# Patient Record
Sex: FEMALE | Race: WHITE | NOT HISPANIC OR LATINO | Employment: OTHER | URBAN - METROPOLITAN AREA
[De-identification: names, ages, dates, MRNs, and addresses within clinical notes are randomized per-mention and may not be internally consistent; named-entity substitution may affect disease eponyms.]

---

## 2023-03-25 DIAGNOSIS — E78.2 MIXED HYPERLIPIDEMIA: ICD-10-CM

## 2023-03-25 DIAGNOSIS — I10 ESSENTIAL (PRIMARY) HYPERTENSION: ICD-10-CM

## 2023-03-27 RX ORDER — ROSUVASTATIN CALCIUM 5 MG/1
TABLET, COATED ORAL
Qty: 90 TABLET | Refills: 1 | OUTPATIENT
Start: 2023-03-27

## 2023-03-27 RX ORDER — LISINOPRIL AND HYDROCHLOROTHIAZIDE 12.5; 2 MG/1; MG/1
TABLET ORAL
Qty: 90 TABLET | Refills: 1 | OUTPATIENT
Start: 2023-03-27

## 2023-04-03 ENCOUNTER — LAB (OUTPATIENT)
Dept: LAB | Facility: LAB | Age: 53
End: 2023-04-03
Payer: COMMERCIAL

## 2023-04-03 DIAGNOSIS — I10 BENIGN ESSENTIAL HTN: ICD-10-CM

## 2023-04-03 DIAGNOSIS — E78.2 MIXED HYPERLIPIDEMIA: ICD-10-CM

## 2023-04-03 DIAGNOSIS — E78.2 MIXED HYPERLIPIDEMIA: Primary | ICD-10-CM

## 2023-04-03 PROBLEM — D36.9 TUBULAR ADENOMA: Status: ACTIVE | Noted: 2023-04-03

## 2023-04-03 LAB
ALANINE AMINOTRANSFERASE (SGPT) (U/L) IN SER/PLAS: 19 U/L (ref 7–45)
ALBUMIN (G/DL) IN SER/PLAS: 4.5 G/DL (ref 3.4–5)
ALKALINE PHOSPHATASE (U/L) IN SER/PLAS: 96 U/L (ref 33–110)
ANION GAP IN SER/PLAS: 13 MMOL/L (ref 10–20)
ASPARTATE AMINOTRANSFERASE (SGOT) (U/L) IN SER/PLAS: 19 U/L (ref 9–39)
BILIRUBIN TOTAL (MG/DL) IN SER/PLAS: 0.9 MG/DL (ref 0–1.2)
CALCIUM (MG/DL) IN SER/PLAS: 9.8 MG/DL (ref 8.6–10.6)
CARBON DIOXIDE, TOTAL (MMOL/L) IN SER/PLAS: 29 MMOL/L (ref 21–32)
CHLORIDE (MMOL/L) IN SER/PLAS: 102 MMOL/L (ref 98–107)
CHOLESTEROL (MG/DL) IN SER/PLAS: 168 MG/DL (ref 0–199)
CHOLESTEROL IN HDL (MG/DL) IN SER/PLAS: 71.5 MG/DL
CHOLESTEROL/HDL RATIO: 2.3
CREATININE (MG/DL) IN SER/PLAS: 0.83 MG/DL (ref 0.5–1.05)
GFR FEMALE: 83 ML/MIN/1.73M2
GLUCOSE (MG/DL) IN SER/PLAS: 100 MG/DL (ref 74–99)
LDL: 74 MG/DL (ref 0–99)
POTASSIUM (MMOL/L) IN SER/PLAS: 3.9 MMOL/L (ref 3.5–5.3)
PROTEIN TOTAL: 6.9 G/DL (ref 6.4–8.2)
SODIUM (MMOL/L) IN SER/PLAS: 140 MMOL/L (ref 136–145)
TRIGLYCERIDE (MG/DL) IN SER/PLAS: 111 MG/DL (ref 0–149)
UREA NITROGEN (MG/DL) IN SER/PLAS: 12 MG/DL (ref 6–23)
VLDL: 22 MG/DL (ref 0–40)

## 2023-04-03 PROCEDURE — 80061 LIPID PANEL: CPT

## 2023-04-03 PROCEDURE — 80053 COMPREHEN METABOLIC PANEL: CPT

## 2023-04-03 PROCEDURE — 36415 COLL VENOUS BLD VENIPUNCTURE: CPT

## 2023-04-04 ENCOUNTER — TELEPHONE (OUTPATIENT)
Dept: PRIMARY CARE | Facility: CLINIC | Age: 53
End: 2023-04-04

## 2023-04-04 NOTE — RESULT ENCOUNTER NOTE
Will discuss at visit:   Sugar, kidney, liver, electrolytes normal    Cholesterol continues to look excellent across-the-board at 168, 71, 74, 111 so I do not anticipate any changes to your medication at your visit on April 6

## 2023-04-04 NOTE — TELEPHONE ENCOUNTER
----- Message from Luis Kulkarni DO sent at 4/3/2023  9:47 PM EDT -----  Will discuss at visit:   Sugar, kidney, liver, electrolytes normal    Cholesterol continues to look excellent across-the-board at 168, 71, 74, 111 so I do not anticipate any changes to your medication at your visit on April 6

## 2023-04-05 PROBLEM — M25.561 CHRONIC PAIN OF RIGHT KNEE: Status: ACTIVE | Noted: 2023-04-05

## 2023-04-05 PROBLEM — M17.11 PRIMARY OSTEOARTHRITIS OF RIGHT KNEE: Status: ACTIVE | Noted: 2023-04-05

## 2023-04-05 PROBLEM — G89.29 CHRONIC PAIN OF RIGHT KNEE: Status: ACTIVE | Noted: 2023-04-05

## 2023-04-05 RX ORDER — POLYETHYLENE GLYCOL 3350, SODIUM CHLORIDE, SODIUM BICARBONATE, POTASSIUM CHLORIDE 420; 11.2; 5.72; 1.48 G/4L; G/4L; G/4L; G/4L
POWDER, FOR SOLUTION ORAL
COMMUNITY
Start: 2022-11-29 | End: 2023-10-16 | Stop reason: WASHOUT

## 2023-04-05 RX ORDER — LISINOPRIL AND HYDROCHLOROTHIAZIDE 12.5; 2 MG/1; MG/1
1 TABLET ORAL DAILY
COMMUNITY
End: 2023-04-06 | Stop reason: SDUPTHER

## 2023-04-05 RX ORDER — ROSUVASTATIN CALCIUM 5 MG/1
5 TABLET, COATED ORAL DAILY
COMMUNITY
End: 2023-04-06 | Stop reason: SDUPTHER

## 2023-04-06 ENCOUNTER — OFFICE VISIT (OUTPATIENT)
Dept: PRIMARY CARE | Facility: CLINIC | Age: 53
End: 2023-04-06
Payer: COMMERCIAL

## 2023-04-06 VITALS
BODY MASS INDEX: 43.59 KG/M2 | DIASTOLIC BLOOD PRESSURE: 80 MMHG | SYSTOLIC BLOOD PRESSURE: 126 MMHG | WEIGHT: 250 LBS | HEART RATE: 70 BPM

## 2023-04-06 DIAGNOSIS — E78.2 MIXED HYPERLIPIDEMIA: ICD-10-CM

## 2023-04-06 DIAGNOSIS — I10 BENIGN ESSENTIAL HTN: Primary | ICD-10-CM

## 2023-04-06 DIAGNOSIS — R14.0 FUNCTIONAL BLOATING: ICD-10-CM

## 2023-04-06 DIAGNOSIS — M25.561 CHRONIC PAIN OF RIGHT KNEE: ICD-10-CM

## 2023-04-06 DIAGNOSIS — G89.29 CHRONIC PAIN OF RIGHT KNEE: ICD-10-CM

## 2023-04-06 PROCEDURE — 3074F SYST BP LT 130 MM HG: CPT | Performed by: FAMILY MEDICINE

## 2023-04-06 PROCEDURE — 99214 OFFICE O/P EST MOD 30 MIN: CPT | Performed by: FAMILY MEDICINE

## 2023-04-06 PROCEDURE — 3079F DIAST BP 80-89 MM HG: CPT | Performed by: FAMILY MEDICINE

## 2023-04-06 RX ORDER — ROSUVASTATIN CALCIUM 5 MG/1
5 TABLET, COATED ORAL DAILY
Qty: 90 TABLET | Refills: 1 | Status: SHIPPED | OUTPATIENT
Start: 2023-04-06 | End: 2023-10-02 | Stop reason: SDUPTHER

## 2023-04-06 RX ORDER — LISINOPRIL AND HYDROCHLOROTHIAZIDE 12.5; 2 MG/1; MG/1
1 TABLET ORAL DAILY
Qty: 90 TABLET | Refills: 1 | Status: SHIPPED | OUTPATIENT
Start: 2023-04-06 | End: 2023-10-02 | Stop reason: SDUPTHER

## 2023-04-06 ASSESSMENT — PATIENT HEALTH QUESTIONNAIRE - PHQ9
2. FEELING DOWN, DEPRESSED OR HOPELESS: NOT AT ALL
SUM OF ALL RESPONSES TO PHQ9 QUESTIONS 1 AND 2: 0
1. LITTLE INTEREST OR PLEASURE IN DOING THINGS: NOT AT ALL

## 2023-04-06 NOTE — PROGRESS NOTES
Subjective   Patient ID: Indira Solomon is a 53 y.o. female who presents for Hypertension and Hyperlipidemia.    HPI   1.  Hypertension  Currently taking lisinopril-hydrochlorothiazide 20-12.5 mg daily.  She does not check her blood pressure at home.  Blood pressure today in office is 126/80.  Tolerates her medication well.  Denies chest pain, shortness of breath, headache, dizziness, leg pain or edema.    2.  Hyperlipidemia  Currently taking rosuvastatin 5 mg daily.  Last lipid panel was performed in April 2023 showing 168, 71.5, 74, 111.  Tolerates medication  Denies myalgias.    3.  Bloating  Over the last 2 to 3 months she has started noticing bloating feeling in her abdomen after she eats breakfast in the morning.  This occasionally happens.  She is also concerned that this may be related to her starting menopause.  She recently has had hot flashes once a month that last maybe a minute or 2.  She is currently taking probiotics which she feels that is helping.  She is not aware of any potential dietary causes of her symptoms.  Denies dysphagia, melena, hematochezia, hematemesis, history of GI cancers, tobacco use.    4.  Right knee pain  I have been seeing her in the past for the right knee pain, she had received a cortisone injection in January 2023.  She felt that the injection helped significantly after about a month.  She was able to go to [place] and walked around without any major pains in her right knee.  She is planning to return to [place] in September 2023.     Review of Systems  All pertinent positive symptoms are included in the history of present illness.    All other systems have been reviewed and are negative and noncontributory to this patient's current ailments.    Objective   /80   Pulse 70   Wt 113 kg (250 lb)   BMI 43.59 kg/m²     Physical Exam  CONSTITUTIONAL - INAD. Not ill appearing. Overweight  SKIN - No lesions or rashes visualized. Good skin turgor.  HEAD - Atraumatic,  normocephalic..  RESP - CTAB. No wheezing, rhonchi, or crackles.   CARDIAC - RRR. No murmurs, gallops, or rubs.  ABDOMEN - Soft, nontender, nondistended. No organomegaly. Negative McBurney sign and negative Chapman sign    Assessment/Plan     1.  Hypertension  Blood pressure is stable today at 126/80.  Continue lisinopril-hydrochlorothiazide 20-12.5 mg daily.  Blood pressure goal should be below 130/80, ideally 120/80  Please check your blood pressure at home and keep a log.  Please bring this log with you to your next appointment    2.  Hyperlipidemia  Your cholesterol looks great, continue rosuvastatin 5 mg daily.    3.  Bloating  If you are finding benefit with your probiotic, recommend to continue this.  You can also purchase over-the-counter enteric peppermint oil to see if this helps with your symptoms.  Highly recommend to monitor what food you are eating and seeing if there are any patterns that may be related to your symptoms.    4.  Right knee pain  I am happy to hear that your pain is doing well.  If you desire to have a cortisone injection prior to going to [place], I would recommend coming to the office about 1 month prior to leaving to allow time for the medication to fully work for him.    Follow up in 6 months for annual physical.

## 2023-10-02 DIAGNOSIS — I10 BENIGN ESSENTIAL HTN: ICD-10-CM

## 2023-10-02 DIAGNOSIS — E78.2 MIXED HYPERLIPIDEMIA: ICD-10-CM

## 2023-10-02 RX ORDER — LISINOPRIL AND HYDROCHLOROTHIAZIDE 12.5; 2 MG/1; MG/1
1 TABLET ORAL DAILY
Qty: 30 TABLET | Refills: 0 | Status: SHIPPED | OUTPATIENT
Start: 2023-10-02 | End: 2023-10-16 | Stop reason: SDUPTHER

## 2023-10-02 RX ORDER — ROSUVASTATIN CALCIUM 5 MG/1
5 TABLET, COATED ORAL DAILY
Qty: 30 TABLET | Refills: 0 | Status: SHIPPED | OUTPATIENT
Start: 2023-10-02 | End: 2023-10-17 | Stop reason: SDUPTHER

## 2023-10-15 ASSESSMENT — PROMIS GLOBAL HEALTH SCALE
RATE_AVERAGE_PAIN: 3
RATE_QUALITY_OF_LIFE: VERY GOOD
RATE_AVERAGE_FATIGUE: MILD
CARRYOUT_PHYSICAL_ACTIVITIES: MOSTLY
RATE_MENTAL_HEALTH: EXCELLENT
RATE_GENERAL_HEALTH: VERY GOOD
RATE_PHYSICAL_HEALTH: GOOD
RATE_SOCIAL_SATISFACTION: VERY GOOD
CARRYOUT_SOCIAL_ACTIVITIES: VERY GOOD
EMOTIONAL_PROBLEMS: NEVER

## 2023-10-16 ENCOUNTER — LAB (OUTPATIENT)
Dept: LAB | Facility: LAB | Age: 53
End: 2023-10-16
Payer: COMMERCIAL

## 2023-10-16 ENCOUNTER — OFFICE VISIT (OUTPATIENT)
Dept: PRIMARY CARE | Facility: CLINIC | Age: 53
End: 2023-10-16
Payer: COMMERCIAL

## 2023-10-16 VITALS — WEIGHT: 246 LBS | HEART RATE: 102 BPM | HEIGHT: 63 IN | BODY MASS INDEX: 43.59 KG/M2

## 2023-10-16 DIAGNOSIS — Z11.59 NEED FOR HEPATITIS C SCREENING TEST: ICD-10-CM

## 2023-10-16 DIAGNOSIS — Z11.4 ENCOUNTER FOR SCREENING FOR HIV: ICD-10-CM

## 2023-10-16 DIAGNOSIS — I10 BENIGN ESSENTIAL HTN: ICD-10-CM

## 2023-10-16 DIAGNOSIS — Z00.00 ANNUAL PHYSICAL EXAM: Primary | ICD-10-CM

## 2023-10-16 DIAGNOSIS — E78.2 MIXED HYPERLIPIDEMIA: ICD-10-CM

## 2023-10-16 DIAGNOSIS — Z12.31 ENCOUNTER FOR SCREENING MAMMOGRAM FOR BREAST CANCER: ICD-10-CM

## 2023-10-16 DIAGNOSIS — Z00.00 ANNUAL PHYSICAL EXAM: ICD-10-CM

## 2023-10-16 DIAGNOSIS — Z23 INFLUENZA VACCINE NEEDED: ICD-10-CM

## 2023-10-16 LAB
ALBUMIN SERPL BCP-MCNC: 4.4 G/DL (ref 3.4–5)
ALP SERPL-CCNC: 139 U/L (ref 33–110)
ALT SERPL W P-5'-P-CCNC: 34 U/L (ref 7–45)
ANION GAP SERPL CALC-SCNC: 13 MMOL/L
AST SERPL W P-5'-P-CCNC: 28 U/L (ref 9–39)
BASOPHILS # BLD AUTO: 0.03 X10*3/UL (ref 0–0.1)
BASOPHILS NFR BLD AUTO: 0.3 %
BILIRUB SERPL-MCNC: 0.5 MG/DL (ref 0–1.2)
BUN SERPL-MCNC: 14 MG/DL (ref 6–23)
CALCIUM SERPL-MCNC: 9.5 MG/DL (ref 8.6–10.3)
CHLORIDE SERPL-SCNC: 103 MMOL/L (ref 98–107)
CHOLEST SERPL-MCNC: 171 MG/DL (ref 0–199)
CHOLESTEROL/HDL RATIO: 3.3
CO2 SERPL-SCNC: 27 MMOL/L (ref 21–32)
CREAT SERPL-MCNC: 0.74 MG/DL (ref 0.5–1.05)
EOSINOPHIL # BLD AUTO: 0.24 X10*3/UL (ref 0–0.7)
EOSINOPHIL NFR BLD AUTO: 2.7 %
ERYTHROCYTE [DISTWIDTH] IN BLOOD BY AUTOMATED COUNT: 12.8 % (ref 11.5–14.5)
GFR SERPL CREATININE-BSD FRML MDRD: >90 ML/MIN/1.73M*2
GLUCOSE SERPL-MCNC: 93 MG/DL (ref 74–99)
HCT VFR BLD AUTO: 42.9 % (ref 36–46)
HDLC SERPL-MCNC: 52.3 MG/DL
HGB BLD-MCNC: 13.6 G/DL (ref 12–16)
HIV 1+2 AB+HIV1 P24 AG SERPL QL IA: NONREACTIVE
IMM GRANULOCYTES # BLD AUTO: 0.1 X10*3/UL (ref 0–0.7)
IMM GRANULOCYTES NFR BLD AUTO: 1.1 % (ref 0–0.9)
LDLC SERPL CALC-MCNC: 86 MG/DL
LYMPHOCYTES # BLD AUTO: 2.41 X10*3/UL (ref 1.2–4.8)
LYMPHOCYTES NFR BLD AUTO: 26.6 %
MCH RBC QN AUTO: 29.8 PG (ref 26–34)
MCHC RBC AUTO-ENTMCNC: 31.7 G/DL (ref 32–36)
MCV RBC AUTO: 94 FL (ref 80–100)
MONOCYTES # BLD AUTO: 0.64 X10*3/UL (ref 0.1–1)
MONOCYTES NFR BLD AUTO: 7.1 %
NEUTROPHILS # BLD AUTO: 5.63 X10*3/UL (ref 1.2–7.7)
NEUTROPHILS NFR BLD AUTO: 62.2 %
NON HDL CHOLESTEROL: 119 MG/DL (ref 0–149)
NRBC BLD-RTO: 0.7 /100 WBCS (ref 0–0)
PLATELET # BLD AUTO: 196 X10*3/UL (ref 150–450)
PMV BLD AUTO: 10.1 FL (ref 7.5–11.5)
POTASSIUM SERPL-SCNC: 3.7 MMOL/L (ref 3.5–5.3)
PROT SERPL-MCNC: 7.4 G/DL (ref 6.4–8.2)
RBC # BLD AUTO: 4.57 X10*6/UL (ref 4–5.2)
SODIUM SERPL-SCNC: 139 MMOL/L (ref 136–145)
TRIGL SERPL-MCNC: 162 MG/DL (ref 0–149)
TSH SERPL-ACNC: 3.19 MIU/L (ref 0.44–3.98)
VLDL: 32 MG/DL (ref 0–40)
WBC # BLD AUTO: 9.1 X10*3/UL (ref 4.4–11.3)

## 2023-10-16 PROCEDURE — 36415 COLL VENOUS BLD VENIPUNCTURE: CPT

## 2023-10-16 PROCEDURE — 87389 HIV-1 AG W/HIV-1&-2 AB AG IA: CPT

## 2023-10-16 PROCEDURE — 80053 COMPREHEN METABOLIC PANEL: CPT

## 2023-10-16 PROCEDURE — 1036F TOBACCO NON-USER: CPT | Performed by: FAMILY MEDICINE

## 2023-10-16 PROCEDURE — 99396 PREV VISIT EST AGE 40-64: CPT | Performed by: FAMILY MEDICINE

## 2023-10-16 PROCEDURE — 86803 HEPATITIS C AB TEST: CPT

## 2023-10-16 PROCEDURE — 99213 OFFICE O/P EST LOW 20 MIN: CPT | Performed by: FAMILY MEDICINE

## 2023-10-16 PROCEDURE — 90471 IMMUNIZATION ADMIN: CPT | Performed by: FAMILY MEDICINE

## 2023-10-16 PROCEDURE — 93000 ELECTROCARDIOGRAM COMPLETE: CPT | Performed by: FAMILY MEDICINE

## 2023-10-16 PROCEDURE — 80061 LIPID PANEL: CPT

## 2023-10-16 PROCEDURE — 85025 COMPLETE CBC W/AUTO DIFF WBC: CPT

## 2023-10-16 PROCEDURE — 90686 IIV4 VACC NO PRSV 0.5 ML IM: CPT | Performed by: FAMILY MEDICINE

## 2023-10-16 PROCEDURE — 84443 ASSAY THYROID STIM HORMONE: CPT

## 2023-10-16 RX ORDER — LISINOPRIL AND HYDROCHLOROTHIAZIDE 12.5; 2 MG/1; MG/1
1 TABLET ORAL DAILY
Qty: 90 TABLET | Refills: 1 | Status: SHIPPED | OUTPATIENT
Start: 2023-10-16 | End: 2024-05-01 | Stop reason: SDUPTHER

## 2023-10-16 RX ORDER — BUTYROSPERMUM PARKII(SHEA BUTTER), SIMMONDSIA CHINENSIS (JOJOBA) SEED OIL, ALOE BARBADENSIS LEAF EXTRACT .01; 1; 3.5 G/100G; G/100G; G/100G
250 LIQUID TOPICAL 2 TIMES DAILY
COMMUNITY

## 2023-10-16 ASSESSMENT — PATIENT HEALTH QUESTIONNAIRE - PHQ9
SUM OF ALL RESPONSES TO PHQ9 QUESTIONS 1 AND 2: 0
1. LITTLE INTEREST OR PLEASURE IN DOING THINGS: NOT AT ALL
2. FEELING DOWN, DEPRESSED OR HOPELESS: NOT AT ALL

## 2023-10-16 NOTE — ASSESSMENT & PLAN NOTE
Complete history and physical examination was performed  EKG reveals normal sinus rhythm without acute changes  We will notify of test results once available  Continue working on lifestyle modification which includes goal of weight loss

## 2023-10-16 NOTE — PROGRESS NOTES
Subjective   Patient ID: Indira Solomon is a 53 y.o. female who presents for Annual Exam.    HPI  1.  Physical exam.  Pap: last done 3/31/20, normal, negative HPV; apparently received note from Dr. Murphy that it is time to have her Pap testing, but she is going to have these examinations, so she has yet to make an appointment  Mammogram: last done 2022, reluctantly willing to have this done again  Colonoscopy: 2023, 5-year clearance  Immunizations are up-to-date  Feeling good today without any significant medical concerns or complaints  Recently got back from [place] where she walked 25,000 steps daily x5    2.  Hyperlipidemia.  Currently taking rosuvastatin 5 mg as prescribed  Tolerating well, requesting refill    3.  Hypertension.  Normotensive on intake, no chest pain or shortness of breath, currently taking medication noted on the chart  Tolerating well, requesting refill    Review of Systems  All pertinent positive symptoms are included in the history of present illness.    All other systems have been reviewed and are negative and noncontributory to this patient's current ailments.    Past Medical History:   Diagnosis Date    Hyperlipidemia     Hypertension     Laceration without foreign body, right foot, initial encounter 10/14/2015    Laceration of foot, right    Other microscopic hematuria 2018    Microscopic hematuria    Primary osteoarthritis of right knee 2023    Tinnitus, bilateral 2019    Tinnitus of both ears    Tubular adenoma 2020     Past Surgical History:   Procedure Laterality Date     SECTION, CLASSIC  10/07/2013     Section    CHOLECYSTECTOMY  2017    COLONOSCOPY  2020    HYSTEROSCOPY  10/07/2013    Hysteroscopy With Endometrial Ablation    TUBAL LIGATION Bilateral 2020     Social History     Tobacco Use    Smoking status: Former     Types: Cigarettes     Passive exposure: Past    Smokeless tobacco: Never     Family History  "  Problem Relation Name Age of Onset    Diabetes Father      Hypertension Father          Essential    Asthma Son      Cancer Mother's Brother       No Known Allergies  Immunization History   Administered Date(s) Administered    Flu vaccine (IIV4), preservative free *Check age/dose* 10/16/2023    Influenza, seasonal, injectable 09/29/2022    Pfizer Purple Cap SARS-CoV-2 03/23/2021, 04/13/2021, 12/18/2021    Tdap vaccine, age 7 year and older (BOOSTRIX) 10/07/2013, 09/10/2021    Zoster vaccine, recombinant, adult (SHINGRIX) 10/26/2021, 03/17/2022     Current Outpatient Medications   Medication Instructions    lisinopriL-hydrochlorothiazide 20-12.5 mg tablet 1 tablet, oral, Daily    rosuvastatin (CRESTOR) 5 mg, oral, Daily    saccharomyces boulardii (FLORASTOR) 250 mg, oral, 2 times daily     Objective   Visit Vitals  Pulse 102   Ht 1.6 m (5' 3\")   Wt 112 kg (246 lb)   BMI 43.58 kg/m²   Smoking Status Former   BSA 2.23 m²     No visits with results within 1 Month(s) from this visit.   Latest known visit with results is:   Lab on 04/03/2023   Component Date Value    Glucose 04/03/2023 100 (H)     Sodium 04/03/2023 140     Potassium 04/03/2023 3.9     Chloride 04/03/2023 102     Bicarbonate 04/03/2023 29     Anion Gap 04/03/2023 13     Urea Nitrogen 04/03/2023 12     Creatinine 04/03/2023 0.83     GFR Female 04/03/2023 83     Calcium 04/03/2023 9.8     Albumin 04/03/2023 4.5     Alkaline Phosphatase 04/03/2023 96     Total Protein 04/03/2023 6.9     AST 04/03/2023 19     Total Bilirubin 04/03/2023 0.9     ALT (SGPT) 04/03/2023 19     Cholesterol 04/03/2023 168     HDL 04/03/2023 71.5     Cholesterol/HDL Ratio 04/03/2023 2.3     LDL 04/03/2023 74     VLDL 04/03/2023 22     Triglycerides 04/03/2023 111      Physical Exam  CONSTITUTIONAL - well nourished, well developed, obese white female, looks like stated age, in no acute distress, not ill-appearing, and not tired appearing  SKIN - normal skin color and pigmentation, " normal skin turgor without rash, lesions, or nodules visualized  HEAD - no trauma, normocephalic  EYES - pupils are equal and reactive to light, extraocular muscles are intact, and normal external exam  ENT - TM's intact, no injection, no signs of infection, uvula midline, normal tongue movement and throat normal, no exudate  CHEST - clear to auscultation, no wheezing, no crackles and no rales, good effort  CARDIAC - regular rate and regular rhythm, no skipped beats, no murmur  ABDOMEN - no organomegaly, soft, nontender, nondistended, normal bowel sounds, no guarding/rebound/rigidity, negative McBurney sign and negative Chapman sign  EXTREMITIES - no edema, no deformities  NEUROLOGICAL - normal gait, normal balance, normal motor, no ataxia, DTRs equal and symmetrical; alert, oriented and no focal signs  PSYCHIATRIC - alert, pleasant and cordial, age-appropriate    Assessment/Plan   Problem List Items Addressed This Visit       Benign essential HTN     Stable, no changes to medication recommended  I would like to have you monitor and record blood pressures at home   Blood pressure goal should be below 130/80, ideally 120/80  If the blood pressure is too high or too low, we need to consider making adjustments to your antihypertensive therapy         Relevant Medications    lisinopriL-hydrochlorothiazide 20-12.5 mg tablet    Mixed hyperlipidemia     We will notify of test results once available and make recommendations on adjusting medication if necessary         Annual physical exam - Primary     Complete history and physical examination was performed  EKG reveals normal sinus rhythm without acute changes  We will notify of test results once available  Continue working on lifestyle modification which includes goal of weight loss         Relevant Orders    Lipid Panel    TSH with reflex to Free T4 if abnormal    Comprehensive Metabolic Panel    CBC and Auto Differential    HIV 1/2 Antigen/Antibody Screen with Reflex to  Confirmation    Hepatitis C Antibody    ECG 12 lead (Clinic Performed) (Completed)     Other Visit Diagnoses       Encounter for screening for HIV        Relevant Orders    HIV 1/2 Antigen/Antibody Screen with Reflex to Confirmation    Influenza vaccine needed        All questions were answered and you were counseled on immunization(s) in detail and vaccination was provided    Relevant Orders    Flu vaccine (IIV4) age 6 months and greater, preservative free (Completed)    Encounter for screening mammogram for breast cancer        Overdue for mammogram, requisition provided    Relevant Orders    BI mammo bilateral screening tomosynthesis    Need for hepatitis C screening test        Relevant Orders    Hepatitis C Antibody

## 2023-10-16 NOTE — ASSESSMENT & PLAN NOTE
We will notify of test results once available and make recommendations on adjusting medication if necessary

## 2023-10-17 DIAGNOSIS — E78.2 MIXED HYPERLIPIDEMIA: ICD-10-CM

## 2023-10-17 LAB — HCV AB SER QL: NONREACTIVE

## 2023-10-17 RX ORDER — ROSUVASTATIN CALCIUM 10 MG/1
10 TABLET, COATED ORAL DAILY
Qty: 90 TABLET | Refills: 1 | Status: SHIPPED | OUTPATIENT
Start: 2023-10-17 | End: 2024-05-02 | Stop reason: SDUPTHER

## 2023-10-18 NOTE — RESULT ENCOUNTER NOTE
Complete blood cell count stable.  Cholesterol 171, 52, 86, 162 previously 168, 71, with triglycerides 111  Sugar, kidney, liver, electrolytes, hepatitis C, HIV, thyroid normal  Alkaline phosphatase 139 previous 96, 103, 111, 137 so it seems to fluctuate for you a bit.  We will continue to monitor    Triglycerides went up a bit, although LDL is at goal so I am happy with that, but you can tolerate rosuvastatin 5 mg daily, I would asked that you let me increase that to 10 mg daily so going to send it over to the pharmacy and manner.  I think we will obtain just a little bit better control for you

## 2023-11-03 ENCOUNTER — ANCILLARY PROCEDURE (OUTPATIENT)
Dept: RADIOLOGY | Facility: CLINIC | Age: 53
End: 2023-11-03
Payer: COMMERCIAL

## 2023-11-03 DIAGNOSIS — Z12.31 ENCOUNTER FOR SCREENING MAMMOGRAM FOR BREAST CANCER: ICD-10-CM

## 2023-11-03 PROCEDURE — 77063 BREAST TOMOSYNTHESIS BI: CPT

## 2023-11-03 PROCEDURE — 77067 SCR MAMMO BI INCL CAD: CPT | Performed by: RADIOLOGY

## 2023-11-03 PROCEDURE — 77063 BREAST TOMOSYNTHESIS BI: CPT | Performed by: RADIOLOGY

## 2023-11-24 ENCOUNTER — HOSPITAL ENCOUNTER (INPATIENT)
Facility: HOSPITAL | Age: 53
LOS: 3 days | Discharge: HOME | DRG: 398 | End: 2023-11-27
Attending: STUDENT IN AN ORGANIZED HEALTH CARE EDUCATION/TRAINING PROGRAM | Admitting: SURGERY
Payer: COMMERCIAL

## 2023-11-24 ENCOUNTER — PREP FOR PROCEDURE (OUTPATIENT)
Dept: SURGERY | Facility: HOSPITAL | Age: 53
End: 2023-11-24
Payer: COMMERCIAL

## 2023-11-24 ENCOUNTER — HOSPITAL ENCOUNTER (EMERGENCY)
Facility: HOSPITAL | Age: 53
Discharge: LEFT BEFORE DISCHARGE | End: 2023-11-24
Payer: COMMERCIAL

## 2023-11-24 ENCOUNTER — APPOINTMENT (OUTPATIENT)
Dept: RADIOLOGY | Facility: HOSPITAL | Age: 53
DRG: 398 | End: 2023-11-24
Payer: COMMERCIAL

## 2023-11-24 VITALS
TEMPERATURE: 98.1 F | HEART RATE: 89 BPM | SYSTOLIC BLOOD PRESSURE: 160 MMHG | RESPIRATION RATE: 18 BRPM | WEIGHT: 240 LBS | HEIGHT: 62 IN | BODY MASS INDEX: 44.16 KG/M2 | OXYGEN SATURATION: 98 % | DIASTOLIC BLOOD PRESSURE: 83 MMHG

## 2023-11-24 DIAGNOSIS — K35.30 ACUTE APPENDICITIS WITH LOCALIZED PERITONITIS, WITHOUT PERFORATION, ABSCESS, OR GANGRENE: ICD-10-CM

## 2023-11-24 DIAGNOSIS — G89.18 ACUTE POSTOPERATIVE PAIN: ICD-10-CM

## 2023-11-24 DIAGNOSIS — K35.30 ACUTE APPENDICITIS WITH LOCALIZED PERITONITIS, WITHOUT PERFORATION, ABSCESS, OR GANGRENE: Primary | ICD-10-CM

## 2023-11-24 DIAGNOSIS — K35.80 ACUTE APPENDICITIS: Primary | ICD-10-CM

## 2023-11-24 LAB
ALBUMIN SERPL BCP-MCNC: 4.4 G/DL (ref 3.4–5)
ALP SERPL-CCNC: 112 U/L (ref 33–110)
ALT SERPL W P-5'-P-CCNC: 28 U/L (ref 7–45)
ANION GAP SERPL CALC-SCNC: 16 MMOL/L (ref 10–20)
APPEARANCE UR: CLEAR
AST SERPL W P-5'-P-CCNC: 29 U/L (ref 9–39)
BACTERIA #/AREA URNS AUTO: ABNORMAL /HPF
BASOPHILS # BLD AUTO: 0.02 X10*3/UL (ref 0–0.1)
BASOPHILS NFR BLD AUTO: 0.2 %
BILIRUB DIRECT SERPL-MCNC: 0.3 MG/DL (ref 0–0.3)
BILIRUB SERPL-MCNC: 1.2 MG/DL (ref 0–1.2)
BILIRUB UR STRIP.AUTO-MCNC: NEGATIVE MG/DL
BUN SERPL-MCNC: 14 MG/DL (ref 6–23)
CALCIUM SERPL-MCNC: 10.1 MG/DL (ref 8.6–10.3)
CHLORIDE SERPL-SCNC: 102 MMOL/L (ref 98–107)
CO2 SERPL-SCNC: 21 MMOL/L (ref 21–32)
COLOR UR: YELLOW
CREAT SERPL-MCNC: 0.77 MG/DL (ref 0.5–1.05)
EOSINOPHIL # BLD AUTO: 0.01 X10*3/UL (ref 0–0.7)
EOSINOPHIL NFR BLD AUTO: 0.1 %
ERYTHROCYTE [DISTWIDTH] IN BLOOD BY AUTOMATED COUNT: 12.6 % (ref 11.5–14.5)
GFR SERPL CREATININE-BSD FRML MDRD: >90 ML/MIN/1.73M*2
GLUCOSE SERPL-MCNC: 104 MG/DL (ref 74–99)
GLUCOSE UR STRIP.AUTO-MCNC: NEGATIVE MG/DL
HCG UR QL IA.RAPID: NEGATIVE
HCT VFR BLD AUTO: 41.7 % (ref 36–46)
HGB BLD-MCNC: 14.4 G/DL (ref 12–16)
HOLD SPECIMEN: NORMAL
IMM GRANULOCYTES # BLD AUTO: 0.04 X10*3/UL (ref 0–0.7)
IMM GRANULOCYTES NFR BLD AUTO: 0.3 % (ref 0–0.9)
KETONES UR STRIP.AUTO-MCNC: ABNORMAL MG/DL
LEUKOCYTE ESTERASE UR QL STRIP.AUTO: NEGATIVE
LIPASE SERPL-CCNC: 13 U/L (ref 9–82)
LYMPHOCYTES # BLD AUTO: 0.66 X10*3/UL (ref 1.2–4.8)
LYMPHOCYTES NFR BLD AUTO: 5.3 %
MCH RBC QN AUTO: 29.6 PG (ref 26–34)
MCHC RBC AUTO-ENTMCNC: 34.5 G/DL (ref 32–36)
MCV RBC AUTO: 86 FL (ref 80–100)
MONOCYTES # BLD AUTO: 0.63 X10*3/UL (ref 0.1–1)
MONOCYTES NFR BLD AUTO: 5.1 %
MUCOUS THREADS #/AREA URNS AUTO: ABNORMAL /LPF
NEUTROPHILS # BLD AUTO: 11.1 X10*3/UL (ref 1.2–7.7)
NEUTROPHILS NFR BLD AUTO: 89 %
NITRITE UR QL STRIP.AUTO: NEGATIVE
NRBC BLD-RTO: 0 /100 WBCS (ref 0–0)
PH UR STRIP.AUTO: 5 [PH]
PLATELET # BLD AUTO: 175 X10*3/UL (ref 150–450)
POTASSIUM SERPL-SCNC: 3.7 MMOL/L (ref 3.5–5.3)
PROT SERPL-MCNC: 7.4 G/DL (ref 6.4–8.2)
PROT UR STRIP.AUTO-MCNC: NEGATIVE MG/DL
RBC # BLD AUTO: 4.86 X10*6/UL (ref 4–5.2)
RBC # UR STRIP.AUTO: ABNORMAL /UL
RBC #/AREA URNS AUTO: ABNORMAL /HPF
SODIUM SERPL-SCNC: 135 MMOL/L (ref 136–145)
SP GR UR STRIP.AUTO: 1.02
SQUAMOUS #/AREA URNS AUTO: ABNORMAL /HPF
UROBILINOGEN UR STRIP.AUTO-MCNC: <2 MG/DL
WBC # BLD AUTO: 12.5 X10*3/UL (ref 4.4–11.3)
WBC #/AREA URNS AUTO: ABNORMAL /HPF
WBC CLUMPS #/AREA URNS AUTO: ABNORMAL /HPF

## 2023-11-24 PROCEDURE — 1210000001 HC SEMI-PRIVATE ROOM DAILY

## 2023-11-24 PROCEDURE — 74177 CT ABD & PELVIS W/CONTRAST: CPT | Performed by: RADIOLOGY

## 2023-11-24 PROCEDURE — 36415 COLL VENOUS BLD VENIPUNCTURE: CPT | Performed by: STUDENT IN AN ORGANIZED HEALTH CARE EDUCATION/TRAINING PROGRAM

## 2023-11-24 PROCEDURE — 82248 BILIRUBIN DIRECT: CPT | Performed by: STUDENT IN AN ORGANIZED HEALTH CARE EDUCATION/TRAINING PROGRAM

## 2023-11-24 PROCEDURE — 83690 ASSAY OF LIPASE: CPT | Performed by: STUDENT IN AN ORGANIZED HEALTH CARE EDUCATION/TRAINING PROGRAM

## 2023-11-24 PROCEDURE — 99281 EMR DPT VST MAYX REQ PHY/QHP: CPT

## 2023-11-24 PROCEDURE — 81001 URINALYSIS AUTO W/SCOPE: CPT | Performed by: STUDENT IN AN ORGANIZED HEALTH CARE EDUCATION/TRAINING PROGRAM

## 2023-11-24 PROCEDURE — 99285 EMERGENCY DEPT VISIT HI MDM: CPT | Mod: 25 | Performed by: STUDENT IN AN ORGANIZED HEALTH CARE EDUCATION/TRAINING PROGRAM

## 2023-11-24 PROCEDURE — 74177 CT ABD & PELVIS W/CONTRAST: CPT

## 2023-11-24 PROCEDURE — 85025 COMPLETE CBC W/AUTO DIFF WBC: CPT | Performed by: STUDENT IN AN ORGANIZED HEALTH CARE EDUCATION/TRAINING PROGRAM

## 2023-11-24 PROCEDURE — 2550000001 HC RX 255 CONTRASTS: Performed by: STUDENT IN AN ORGANIZED HEALTH CARE EDUCATION/TRAINING PROGRAM

## 2023-11-24 PROCEDURE — 81025 URINE PREGNANCY TEST: CPT | Performed by: STUDENT IN AN ORGANIZED HEALTH CARE EDUCATION/TRAINING PROGRAM

## 2023-11-24 PROCEDURE — 4500999001 HC ED NO CHARGE

## 2023-11-24 PROCEDURE — 2500000004 HC RX 250 GENERAL PHARMACY W/ HCPCS (ALT 636 FOR OP/ED): Performed by: STUDENT IN AN ORGANIZED HEALTH CARE EDUCATION/TRAINING PROGRAM

## 2023-11-24 PROCEDURE — 96374 THER/PROPH/DIAG INJ IV PUSH: CPT

## 2023-11-24 RX ORDER — MORPHINE SULFATE 4 MG/ML
4 INJECTION, SOLUTION INTRAMUSCULAR; INTRAVENOUS ONCE
Status: COMPLETED | OUTPATIENT
Start: 2023-11-24 | End: 2023-11-24

## 2023-11-24 RX ADMIN — IOHEXOL 75 ML: 350 INJECTION, SOLUTION INTRAVENOUS at 22:03

## 2023-11-24 RX ADMIN — MORPHINE SULFATE 4 MG: 4 INJECTION, SOLUTION INTRAMUSCULAR; INTRAVENOUS at 23:25

## 2023-11-24 RX ADMIN — PIPERACILLIN SODIUM AND TAZOBACTAM SODIUM 3.38 G: 3; .375 INJECTION, SOLUTION INTRAVENOUS at 23:19

## 2023-11-24 RX ADMIN — MORPHINE SULFATE 4 MG: 4 INJECTION, SOLUTION INTRAMUSCULAR; INTRAVENOUS at 21:03

## 2023-11-24 ASSESSMENT — PAIN DESCRIPTION - LOCATION
LOCATION: ABDOMEN
LOCATION: ABDOMEN

## 2023-11-24 ASSESSMENT — LIFESTYLE VARIABLES
REASON UNABLE TO ASSESS: NO
EVER HAD A DRINK FIRST THING IN THE MORNING TO STEADY YOUR NERVES TO GET RID OF A HANGOVER: NO
HAVE PEOPLE ANNOYED YOU BY CRITICIZING YOUR DRINKING: NO
HAVE YOU EVER FELT YOU SHOULD CUT DOWN ON YOUR DRINKING: NO
EVER FELT BAD OR GUILTY ABOUT YOUR DRINKING: NO

## 2023-11-24 ASSESSMENT — PAIN SCALES - GENERAL
PAINLEVEL_OUTOF10: 8
PAINLEVEL_OUTOF10: 10 - WORST POSSIBLE PAIN
PAINLEVEL_OUTOF10: 7

## 2023-11-24 ASSESSMENT — COLUMBIA-SUICIDE SEVERITY RATING SCALE - C-SSRS
1. IN THE PAST MONTH, HAVE YOU WISHED YOU WERE DEAD OR WISHED YOU COULD GO TO SLEEP AND NOT WAKE UP?: NO
6. HAVE YOU EVER DONE ANYTHING, STARTED TO DO ANYTHING, OR PREPARED TO DO ANYTHING TO END YOUR LIFE?: NO
1. IN THE PAST MONTH, HAVE YOU WISHED YOU WERE DEAD OR WISHED YOU COULD GO TO SLEEP AND NOT WAKE UP?: NO
6. HAVE YOU EVER DONE ANYTHING, STARTED TO DO ANYTHING, OR PREPARED TO DO ANYTHING TO END YOUR LIFE?: NO
2. HAVE YOU ACTUALLY HAD ANY THOUGHTS OF KILLING YOURSELF?: NO
2. HAVE YOU ACTUALLY HAD ANY THOUGHTS OF KILLING YOURSELF?: NO

## 2023-11-24 ASSESSMENT — PAIN DESCRIPTION - ORIENTATION: ORIENTATION: RIGHT;LOWER

## 2023-11-24 ASSESSMENT — PAIN DESCRIPTION - PAIN TYPE: TYPE: ACUTE PAIN

## 2023-11-24 ASSESSMENT — PAIN - FUNCTIONAL ASSESSMENT
PAIN_FUNCTIONAL_ASSESSMENT: 0-10
PAIN_FUNCTIONAL_ASSESSMENT: 0-10

## 2023-11-24 ASSESSMENT — PAIN DESCRIPTION - PROGRESSION: CLINICAL_PROGRESSION: NOT CHANGED

## 2023-11-24 NOTE — ED TRIAGE NOTES
Pt to ED with c/o RLQ abdominal pain since last night. Pt reports nausea without emesis. Denies fevers/chills. Pt reports still having appendix. Hx cholecystectomy.

## 2023-11-25 ENCOUNTER — ANESTHESIA (OUTPATIENT)
Dept: OPERATING ROOM | Facility: HOSPITAL | Age: 53
DRG: 398 | End: 2023-11-25
Payer: COMMERCIAL

## 2023-11-25 ENCOUNTER — ANESTHESIA EVENT (OUTPATIENT)
Dept: OPERATING ROOM | Facility: HOSPITAL | Age: 53
DRG: 398 | End: 2023-11-25
Payer: COMMERCIAL

## 2023-11-25 PROBLEM — E66.01 CLASS 3 SEVERE OBESITY IN ADULT (MULTI): Status: ACTIVE | Noted: 2023-11-25

## 2023-11-25 PROBLEM — E66.813 CLASS 3 SEVERE OBESITY IN ADULT: Status: ACTIVE | Noted: 2023-11-25

## 2023-11-25 LAB
ANION GAP SERPL CALC-SCNC: 15 MMOL/L (ref 10–20)
BASOPHILS # BLD AUTO: 0.02 X10*3/UL (ref 0–0.1)
BASOPHILS NFR BLD AUTO: 0.1 %
BUN SERPL-MCNC: 14 MG/DL (ref 6–23)
CALCIUM SERPL-MCNC: 8.9 MG/DL (ref 8.6–10.3)
CHLORIDE SERPL-SCNC: 104 MMOL/L (ref 98–107)
CO2 SERPL-SCNC: 23 MMOL/L (ref 21–32)
CREAT SERPL-MCNC: 0.93 MG/DL (ref 0.5–1.05)
EOSINOPHIL # BLD AUTO: 0 X10*3/UL (ref 0–0.7)
EOSINOPHIL NFR BLD AUTO: 0 %
ERYTHROCYTE [DISTWIDTH] IN BLOOD BY AUTOMATED COUNT: 13 % (ref 11.5–14.5)
GFR SERPL CREATININE-BSD FRML MDRD: 73 ML/MIN/1.73M*2
GLUCOSE SERPL-MCNC: 151 MG/DL (ref 74–99)
HCT VFR BLD AUTO: 37.9 % (ref 36–46)
HGB BLD-MCNC: 12.7 G/DL (ref 12–16)
IMM GRANULOCYTES # BLD AUTO: 0.08 X10*3/UL (ref 0–0.7)
IMM GRANULOCYTES NFR BLD AUTO: 0.5 % (ref 0–0.9)
LYMPHOCYTES # BLD AUTO: 0.68 X10*3/UL (ref 1.2–4.8)
LYMPHOCYTES NFR BLD AUTO: 4.6 %
MCH RBC QN AUTO: 29.1 PG (ref 26–34)
MCHC RBC AUTO-ENTMCNC: 33.5 G/DL (ref 32–36)
MCV RBC AUTO: 87 FL (ref 80–100)
MONOCYTES # BLD AUTO: 0.45 X10*3/UL (ref 0.1–1)
MONOCYTES NFR BLD AUTO: 3 %
NEUTROPHILS # BLD AUTO: 13.64 X10*3/UL (ref 1.2–7.7)
NEUTROPHILS NFR BLD AUTO: 91.8 %
NRBC BLD-RTO: 0 /100 WBCS (ref 0–0)
PLATELET # BLD AUTO: 188 X10*3/UL (ref 150–450)
POTASSIUM SERPL-SCNC: 4 MMOL/L (ref 3.5–5.3)
RBC # BLD AUTO: 4.37 X10*6/UL (ref 4–5.2)
SODIUM SERPL-SCNC: 138 MMOL/L (ref 136–145)
WBC # BLD AUTO: 14.9 X10*3/UL (ref 4.4–11.3)

## 2023-11-25 PROCEDURE — 2500000004 HC RX 250 GENERAL PHARMACY W/ HCPCS (ALT 636 FOR OP/ED): Performed by: ANESTHESIOLOGY

## 2023-11-25 PROCEDURE — 2500000004 HC RX 250 GENERAL PHARMACY W/ HCPCS (ALT 636 FOR OP/ED): Performed by: NURSE ANESTHETIST, CERTIFIED REGISTERED

## 2023-11-25 PROCEDURE — 87075 CULTR BACTERIA EXCEPT BLOOD: CPT | Mod: PARLAB | Performed by: SURGERY

## 2023-11-25 PROCEDURE — 3600000009 HC OR TIME - EACH INCREMENTAL 1 MINUTE - PROCEDURE LEVEL FOUR: Performed by: SURGERY

## 2023-11-25 PROCEDURE — 88304 TISSUE EXAM BY PATHOLOGIST: CPT | Mod: TC | Performed by: SURGERY

## 2023-11-25 PROCEDURE — 3600000004 HC OR TIME - INITIAL BASE CHARGE - PROCEDURE LEVEL FOUR: Performed by: SURGERY

## 2023-11-25 PROCEDURE — 88304 TISSUE EXAM BY PATHOLOGIST: CPT | Mod: TC,SUR | Performed by: SURGERY

## 2023-11-25 PROCEDURE — A44970 PR LAP,APPENDECTOMY: Performed by: ANESTHESIOLOGY

## 2023-11-25 PROCEDURE — 2720000007 HC OR 272 NO HCPCS: Performed by: SURGERY

## 2023-11-25 PROCEDURE — 2500000005 HC RX 250 GENERAL PHARMACY W/O HCPCS: Performed by: SURGERY

## 2023-11-25 PROCEDURE — 99140 ANES COMP EMERGENCY COND: CPT | Performed by: ANESTHESIOLOGY

## 2023-11-25 PROCEDURE — 2500000004 HC RX 250 GENERAL PHARMACY W/ HCPCS (ALT 636 FOR OP/ED): Performed by: SURGERY

## 2023-11-25 PROCEDURE — 36415 COLL VENOUS BLD VENIPUNCTURE: CPT | Performed by: ANESTHESIOLOGY

## 2023-11-25 PROCEDURE — 7100000001 HC RECOVERY ROOM TIME - INITIAL BASE CHARGE: Performed by: SURGERY

## 2023-11-25 PROCEDURE — 36415 COLL VENOUS BLD VENIPUNCTURE: CPT | Performed by: SURGERY

## 2023-11-25 PROCEDURE — 1100000001 HC PRIVATE ROOM DAILY

## 2023-11-25 PROCEDURE — 2500000004 HC RX 250 GENERAL PHARMACY W/ HCPCS (ALT 636 FOR OP/ED)

## 2023-11-25 PROCEDURE — 3700000002 HC GENERAL ANESTHESIA TIME - EACH INCREMENTAL 1 MINUTE: Performed by: SURGERY

## 2023-11-25 PROCEDURE — 2500000001 HC RX 250 WO HCPCS SELF ADMINISTERED DRUGS (ALT 637 FOR MEDICARE OP): Performed by: SURGERY

## 2023-11-25 PROCEDURE — 99024 POSTOP FOLLOW-UP VISIT: CPT | Performed by: SURGERY

## 2023-11-25 PROCEDURE — A4217 STERILE WATER/SALINE, 500 ML: HCPCS | Performed by: SURGERY

## 2023-11-25 PROCEDURE — 0DTJ4ZZ RESECTION OF APPENDIX, PERCUTANEOUS ENDOSCOPIC APPROACH: ICD-10-PCS | Performed by: SURGERY

## 2023-11-25 PROCEDURE — 80048 BASIC METABOLIC PNL TOTAL CA: CPT | Performed by: ANESTHESIOLOGY

## 2023-11-25 PROCEDURE — A44970 PR LAP,APPENDECTOMY: Performed by: NURSE ANESTHETIST, CERTIFIED REGISTERED

## 2023-11-25 PROCEDURE — 99223 1ST HOSP IP/OBS HIGH 75: CPT | Performed by: SURGERY

## 2023-11-25 PROCEDURE — 7100000002 HC RECOVERY ROOM TIME - EACH INCREMENTAL 1 MINUTE: Performed by: SURGERY

## 2023-11-25 PROCEDURE — 96372 THER/PROPH/DIAG INJ SC/IM: CPT | Performed by: SURGERY

## 2023-11-25 PROCEDURE — 88304 TISSUE EXAM BY PATHOLOGIST: CPT | Performed by: PATHOLOGY

## 2023-11-25 PROCEDURE — 44970 LAPAROSCOPY APPENDECTOMY: CPT | Performed by: SURGERY

## 2023-11-25 PROCEDURE — 85025 COMPLETE CBC W/AUTO DIFF WBC: CPT | Performed by: SURGERY

## 2023-11-25 PROCEDURE — 2500000005 HC RX 250 GENERAL PHARMACY W/O HCPCS: Performed by: NURSE ANESTHETIST, CERTIFIED REGISTERED

## 2023-11-25 PROCEDURE — 3700000001 HC GENERAL ANESTHESIA TIME - INITIAL BASE CHARGE: Performed by: SURGERY

## 2023-11-25 RX ORDER — MIDAZOLAM HYDROCHLORIDE 1 MG/ML
INJECTION, SOLUTION INTRAMUSCULAR; INTRAVENOUS AS NEEDED
Status: DISCONTINUED | OUTPATIENT
Start: 2023-11-25 | End: 2023-11-25

## 2023-11-25 RX ORDER — ACETAMINOPHEN 325 MG/1
650 TABLET ORAL EVERY 4 HOURS PRN
Status: DISCONTINUED | OUTPATIENT
Start: 2023-11-25 | End: 2023-11-25

## 2023-11-25 RX ORDER — NALOXONE HYDROCHLORIDE 1 MG/ML
0.2 INJECTION INTRAMUSCULAR; INTRAVENOUS; SUBCUTANEOUS EVERY 5 MIN PRN
Status: DISCONTINUED | OUTPATIENT
Start: 2023-11-25 | End: 2023-11-27 | Stop reason: HOSPADM

## 2023-11-25 RX ORDER — KETOROLAC TROMETHAMINE 30 MG/ML
INJECTION, SOLUTION INTRAMUSCULAR; INTRAVENOUS AS NEEDED
Status: DISCONTINUED | OUTPATIENT
Start: 2023-11-25 | End: 2023-11-25

## 2023-11-25 RX ORDER — OXYCODONE AND ACETAMINOPHEN 5; 325 MG/1; MG/1
1 TABLET ORAL EVERY 6 HOURS PRN
Qty: 8 TABLET | Refills: 0 | OUTPATIENT
Start: 2023-11-25

## 2023-11-25 RX ORDER — PROMETHAZINE HYDROCHLORIDE 25 MG/1
25 TABLET ORAL EVERY 6 HOURS PRN
Status: DISCONTINUED | OUTPATIENT
Start: 2023-11-25 | End: 2023-11-27 | Stop reason: HOSPADM

## 2023-11-25 RX ORDER — PROMETHAZINE HYDROCHLORIDE 25 MG/1
25 SUPPOSITORY RECTAL EVERY 12 HOURS PRN
Status: DISCONTINUED | OUTPATIENT
Start: 2023-11-25 | End: 2023-11-27 | Stop reason: HOSPADM

## 2023-11-25 RX ORDER — ROCURONIUM BROMIDE 10 MG/ML
INJECTION, SOLUTION INTRAVENOUS AS NEEDED
Status: DISCONTINUED | OUTPATIENT
Start: 2023-11-25 | End: 2023-11-25

## 2023-11-25 RX ORDER — METOCLOPRAMIDE HYDROCHLORIDE 5 MG/ML
INJECTION INTRAMUSCULAR; INTRAVENOUS AS NEEDED
Status: DISCONTINUED | OUTPATIENT
Start: 2023-11-25 | End: 2023-11-25

## 2023-11-25 RX ORDER — FAMOTIDINE 10 MG/ML
20 INJECTION INTRAVENOUS 2 TIMES DAILY
Status: DISCONTINUED | OUTPATIENT
Start: 2023-11-25 | End: 2023-11-27 | Stop reason: HOSPADM

## 2023-11-25 RX ORDER — ONDANSETRON HYDROCHLORIDE 2 MG/ML
4 INJECTION, SOLUTION INTRAVENOUS EVERY 8 HOURS PRN
Status: DISCONTINUED | OUTPATIENT
Start: 2023-11-25 | End: 2023-11-27 | Stop reason: HOSPADM

## 2023-11-25 RX ORDER — IPRATROPIUM BROMIDE 0.5 MG/2.5ML
500 SOLUTION RESPIRATORY (INHALATION) ONCE
Status: DISCONTINUED | OUTPATIENT
Start: 2023-11-25 | End: 2023-11-25 | Stop reason: HOSPADM

## 2023-11-25 RX ORDER — HEPARIN SODIUM 5000 [USP'U]/ML
7500 INJECTION, SOLUTION INTRAVENOUS; SUBCUTANEOUS EVERY 8 HOURS SCHEDULED
Status: DISCONTINUED | OUTPATIENT
Start: 2023-11-25 | End: 2023-11-27 | Stop reason: HOSPADM

## 2023-11-25 RX ORDER — DIPHENHYDRAMINE HYDROCHLORIDE 50 MG/ML
12.5 INJECTION INTRAMUSCULAR; INTRAVENOUS ONCE AS NEEDED
Status: DISCONTINUED | OUTPATIENT
Start: 2023-11-25 | End: 2023-11-25 | Stop reason: HOSPADM

## 2023-11-25 RX ORDER — OXYCODONE HYDROCHLORIDE 5 MG/1
5 TABLET ORAL EVERY 6 HOURS PRN
Status: DISCONTINUED | OUTPATIENT
Start: 2023-11-25 | End: 2023-11-27 | Stop reason: HOSPADM

## 2023-11-25 RX ORDER — SODIUM CHLORIDE, SODIUM LACTATE, POTASSIUM CHLORIDE, CALCIUM CHLORIDE 600; 310; 30; 20 MG/100ML; MG/100ML; MG/100ML; MG/100ML
100 INJECTION, SOLUTION INTRAVENOUS CONTINUOUS
Status: DISCONTINUED | OUTPATIENT
Start: 2023-11-25 | End: 2023-11-25

## 2023-11-25 RX ORDER — SODIUM CHLORIDE 0.9 G/100ML
IRRIGANT IRRIGATION AS NEEDED
Status: DISCONTINUED | OUTPATIENT
Start: 2023-11-25 | End: 2023-11-25 | Stop reason: HOSPADM

## 2023-11-25 RX ORDER — DEXAMETHASONE SODIUM PHOSPHATE 10 MG/ML
6 INJECTION INTRAMUSCULAR; INTRAVENOUS ONCE
Status: COMPLETED | OUTPATIENT
Start: 2023-11-25 | End: 2023-11-25

## 2023-11-25 RX ORDER — ONDANSETRON 4 MG/1
4 TABLET, ORALLY DISINTEGRATING ORAL EVERY 8 HOURS PRN
Status: DISCONTINUED | OUTPATIENT
Start: 2023-11-25 | End: 2023-11-27 | Stop reason: HOSPADM

## 2023-11-25 RX ORDER — HYDROMORPHONE HYDROCHLORIDE 1 MG/ML
INJECTION, SOLUTION INTRAMUSCULAR; INTRAVENOUS; SUBCUTANEOUS
Status: COMPLETED
Start: 2023-11-25 | End: 2023-11-25

## 2023-11-25 RX ORDER — DEXAMETHASONE SODIUM PHOSPHATE 4 MG/ML
INJECTION, SOLUTION INTRA-ARTICULAR; INTRALESIONAL; INTRAMUSCULAR; INTRAVENOUS; SOFT TISSUE AS NEEDED
Status: DISCONTINUED | OUTPATIENT
Start: 2023-11-25 | End: 2023-11-25

## 2023-11-25 RX ORDER — FENTANYL CITRATE 50 UG/ML
INJECTION, SOLUTION INTRAMUSCULAR; INTRAVENOUS AS NEEDED
Status: DISCONTINUED | OUTPATIENT
Start: 2023-11-25 | End: 2023-11-25

## 2023-11-25 RX ORDER — ALBUTEROL SULFATE 0.83 MG/ML
2.5 SOLUTION RESPIRATORY (INHALATION) ONCE AS NEEDED
Status: DISCONTINUED | OUTPATIENT
Start: 2023-11-25 | End: 2023-11-25 | Stop reason: HOSPADM

## 2023-11-25 RX ORDER — LIDOCAINE HYDROCHLORIDE 20 MG/ML
INJECTION, SOLUTION INFILTRATION; PERINEURAL AS NEEDED
Status: DISCONTINUED | OUTPATIENT
Start: 2023-11-25 | End: 2023-11-25

## 2023-11-25 RX ORDER — KETOROLAC TROMETHAMINE 30 MG/ML
30 INJECTION, SOLUTION INTRAMUSCULAR; INTRAVENOUS ONCE AS NEEDED
Status: DISCONTINUED | OUTPATIENT
Start: 2023-11-25 | End: 2023-11-25

## 2023-11-25 RX ORDER — LIDOCAINE HYDROCHLORIDE 10 MG/ML
0.1 INJECTION INFILTRATION; PERINEURAL ONCE
Status: DISCONTINUED | OUTPATIENT
Start: 2023-11-25 | End: 2023-11-25 | Stop reason: HOSPADM

## 2023-11-25 RX ORDER — ACETAMINOPHEN 325 MG/1
650 TABLET ORAL EVERY 4 HOURS PRN
Status: DISCONTINUED | OUTPATIENT
Start: 2023-11-25 | End: 2023-11-27 | Stop reason: HOSPADM

## 2023-11-25 RX ORDER — ONDANSETRON HYDROCHLORIDE 2 MG/ML
4 INJECTION, SOLUTION INTRAVENOUS ONCE AS NEEDED
Status: DISCONTINUED | OUTPATIENT
Start: 2023-11-25 | End: 2023-11-25 | Stop reason: HOSPADM

## 2023-11-25 RX ORDER — FAMOTIDINE 20 MG/1
20 TABLET, FILM COATED ORAL 2 TIMES DAILY
Status: DISCONTINUED | OUTPATIENT
Start: 2023-11-25 | End: 2023-11-27 | Stop reason: HOSPADM

## 2023-11-25 RX ORDER — PROPOFOL 10 MG/ML
INJECTION, EMULSION INTRAVENOUS AS NEEDED
Status: DISCONTINUED | OUTPATIENT
Start: 2023-11-25 | End: 2023-11-25

## 2023-11-25 RX ORDER — ONDANSETRON HYDROCHLORIDE 2 MG/ML
4 INJECTION, SOLUTION INTRAVENOUS ONCE AS NEEDED
Status: DISCONTINUED | OUTPATIENT
Start: 2023-11-25 | End: 2023-11-25

## 2023-11-25 RX ORDER — BUPIVACAINE HYDROCHLORIDE 5 MG/ML
INJECTION, SOLUTION PERINEURAL AS NEEDED
Status: DISCONTINUED | OUTPATIENT
Start: 2023-11-25 | End: 2023-11-25 | Stop reason: HOSPADM

## 2023-11-25 RX ORDER — ONDANSETRON HYDROCHLORIDE 2 MG/ML
INJECTION, SOLUTION INTRAVENOUS AS NEEDED
Status: DISCONTINUED | OUTPATIENT
Start: 2023-11-25 | End: 2023-11-25

## 2023-11-25 RX ADMIN — ONDANSETRON 4 MG: 2 INJECTION INTRAMUSCULAR; INTRAVENOUS at 03:14

## 2023-11-25 RX ADMIN — SODIUM CHLORIDE, POTASSIUM CHLORIDE, SODIUM LACTATE AND CALCIUM CHLORIDE: 600; 310; 30; 20 INJECTION, SOLUTION INTRAVENOUS at 02:21

## 2023-11-25 RX ADMIN — DEXAMETHASONE SODIUM PHOSPHATE 4 MG: 4 INJECTION, SOLUTION INTRAMUSCULAR; INTRAVENOUS at 02:45

## 2023-11-25 RX ADMIN — MIDAZOLAM 2 MG: 1 INJECTION INTRAMUSCULAR; INTRAVENOUS at 02:24

## 2023-11-25 RX ADMIN — FAMOTIDINE 20 MG: 20 TABLET ORAL at 09:46

## 2023-11-25 RX ADMIN — SUGAMMADEX 200 MG: 100 INJECTION, SOLUTION INTRAVENOUS at 03:29

## 2023-11-25 RX ADMIN — FAMOTIDINE 20 MG: 10 INJECTION, SOLUTION INTRAVENOUS at 21:03

## 2023-11-25 RX ADMIN — HEPARIN SODIUM 7500 UNITS: 5000 INJECTION INTRAVENOUS; SUBCUTANEOUS at 21:04

## 2023-11-25 RX ADMIN — METOCLOPRAMIDE 10 MG: 5 INJECTION, SOLUTION INTRAMUSCULAR; INTRAVENOUS at 02:45

## 2023-11-25 RX ADMIN — PIPERACILLIN SODIUM AND TAZOBACTAM SODIUM 3.38 G: 3; .375 INJECTION, SOLUTION INTRAVENOUS at 12:43

## 2023-11-25 RX ADMIN — OXYCODONE HYDROCHLORIDE 5 MG: 5 TABLET ORAL at 09:54

## 2023-11-25 RX ADMIN — OXYCODONE HYDROCHLORIDE 5 MG: 5 TABLET ORAL at 16:20

## 2023-11-25 RX ADMIN — SODIUM CHLORIDE, POTASSIUM CHLORIDE, SODIUM LACTATE AND CALCIUM CHLORIDE 100 ML/HR: 600; 310; 30; 20 INJECTION, SOLUTION INTRAVENOUS at 03:33

## 2023-11-25 RX ADMIN — FENTANYL CITRATE 100 MCG: 50 INJECTION, SOLUTION INTRAMUSCULAR; INTRAVENOUS at 02:24

## 2023-11-25 RX ADMIN — PIPERACILLIN SODIUM AND TAZOBACTAM SODIUM 3.38 G: 3; .375 INJECTION, SOLUTION INTRAVENOUS at 06:50

## 2023-11-25 RX ADMIN — PIPERACILLIN SODIUM AND TAZOBACTAM SODIUM 3.38 G: 3; .375 INJECTION, SOLUTION INTRAVENOUS at 18:21

## 2023-11-25 RX ADMIN — ROCURONIUM BROMIDE 50 MG: 10 INJECTION, SOLUTION INTRAVENOUS at 02:24

## 2023-11-25 RX ADMIN — KETOROLAC TROMETHAMINE 30 MG: 30 INJECTION, SOLUTION INTRAMUSCULAR; INTRAVENOUS at 03:14

## 2023-11-25 RX ADMIN — HEPARIN SODIUM 7500 UNITS: 5000 INJECTION INTRAVENOUS; SUBCUTANEOUS at 14:55

## 2023-11-25 RX ADMIN — SODIUM CHLORIDE, POTASSIUM CHLORIDE, SODIUM LACTATE AND CALCIUM CHLORIDE 100 ML/HR: 600; 310; 30; 20 INJECTION, SOLUTION INTRAVENOUS at 06:51

## 2023-11-25 RX ADMIN — PROPOFOL 200 MG: 10 INJECTION, EMULSION INTRAVENOUS at 02:24

## 2023-11-25 RX ADMIN — HYDROMORPHONE HYDROCHLORIDE 0.5 MG: 1 INJECTION, SOLUTION INTRAMUSCULAR; INTRAVENOUS; SUBCUTANEOUS at 02:03

## 2023-11-25 RX ADMIN — LIDOCAINE HYDROCHLORIDE 100 MG: 20 INJECTION, SOLUTION INFILTRATION; PERINEURAL at 02:24

## 2023-11-25 RX ADMIN — DEXAMETHASONE SODIUM PHOSPHATE 6 MG: 10 INJECTION, SOLUTION INTRAMUSCULAR; INTRAVENOUS at 02:04

## 2023-11-25 SDOH — SOCIAL STABILITY: SOCIAL INSECURITY: HAVE YOU HAD THOUGHTS OF HARMING ANYONE ELSE?: NO

## 2023-11-25 SDOH — SOCIAL STABILITY: SOCIAL INSECURITY: WERE YOU ABLE TO COMPLETE ALL THE BEHAVIORAL HEALTH SCREENINGS?: YES

## 2023-11-25 SDOH — SOCIAL STABILITY: SOCIAL INSECURITY: DO YOU FEEL UNSAFE GOING BACK TO THE PLACE WHERE YOU ARE LIVING?: NO

## 2023-11-25 SDOH — HEALTH STABILITY: MENTAL HEALTH: CURRENT SMOKER: 0

## 2023-11-25 SDOH — SOCIAL STABILITY: SOCIAL INSECURITY: HAS ANYONE EVER THREATENED TO HURT YOUR FAMILY OR YOUR PETS?: NO

## 2023-11-25 SDOH — SOCIAL STABILITY: SOCIAL INSECURITY: ARE THERE ANY APPARENT SIGNS OF INJURIES/BEHAVIORS THAT COULD BE RELATED TO ABUSE/NEGLECT?: NO

## 2023-11-25 SDOH — SOCIAL STABILITY: SOCIAL INSECURITY: ARE YOU OR HAVE YOU BEEN THREATENED OR ABUSED PHYSICALLY, EMOTIONALLY, OR SEXUALLY BY ANYONE?: NO

## 2023-11-25 SDOH — SOCIAL STABILITY: SOCIAL INSECURITY: DOES ANYONE TRY TO KEEP YOU FROM HAVING/CONTACTING OTHER FRIENDS OR DOING THINGS OUTSIDE YOUR HOME?: NO

## 2023-11-25 SDOH — SOCIAL STABILITY: SOCIAL INSECURITY: ABUSE: ADULT

## 2023-11-25 SDOH — SOCIAL STABILITY: SOCIAL INSECURITY: DO YOU FEEL ANYONE HAS EXPLOITED OR TAKEN ADVANTAGE OF YOU FINANCIALLY OR OF YOUR PERSONAL PROPERTY?: NO

## 2023-11-25 ASSESSMENT — ACTIVITIES OF DAILY LIVING (ADL)
GROOMING: INDEPENDENT
HEARING - RIGHT EAR: FUNCTIONAL
WALKS IN HOME: INDEPENDENT
BATHING: INDEPENDENT
DRESSING YOURSELF: INDEPENDENT
JUDGMENT_ADEQUATE_SAFELY_COMPLETE_DAILY_ACTIVITIES: YES
LACK_OF_TRANSPORTATION: NO
PATIENT'S MEMORY ADEQUATE TO SAFELY COMPLETE DAILY ACTIVITIES?: YES
FEEDING YOURSELF: INDEPENDENT
HEARING - LEFT EAR: FUNCTIONAL
TOILETING: INDEPENDENT
ADEQUATE_TO_COMPLETE_ADL: YES

## 2023-11-25 ASSESSMENT — PAIN - FUNCTIONAL ASSESSMENT
PAIN_FUNCTIONAL_ASSESSMENT: 0-10
PAIN_FUNCTIONAL_ASSESSMENT: 0-10

## 2023-11-25 ASSESSMENT — LIFESTYLE VARIABLES
HOW MANY STANDARD DRINKS CONTAINING ALCOHOL DO YOU HAVE ON A TYPICAL DAY: 1 OR 2
HOW OFTEN DO YOU HAVE 6 OR MORE DRINKS ON ONE OCCASION: NEVER
AUDIT-C TOTAL SCORE: 1
SUBSTANCE_ABUSE_PAST_12_MONTHS: NO
SKIP TO QUESTIONS 9-10: 1
HOW OFTEN DO YOU HAVE A DRINK CONTAINING ALCOHOL: MONTHLY OR LESS
AUDIT-C TOTAL SCORE: 1
PRESCIPTION_ABUSE_PAST_12_MONTHS: NO

## 2023-11-25 ASSESSMENT — PAIN SCALES - GENERAL
PAINLEVEL_OUTOF10: 7
PAINLEVEL_OUTOF10: 7
PAINLEVEL_OUTOF10: 6
PAINLEVEL_OUTOF10: 0 - NO PAIN
PAINLEVEL_OUTOF10: 0 - NO PAIN
PAIN_LEVEL: 0

## 2023-11-25 ASSESSMENT — COGNITIVE AND FUNCTIONAL STATUS - GENERAL
MOBILITY SCORE: 24
PATIENT BASELINE BEDBOUND: NO
DAILY ACTIVITIY SCORE: 24

## 2023-11-25 ASSESSMENT — PATIENT HEALTH QUESTIONNAIRE - PHQ9
SUM OF ALL RESPONSES TO PHQ9 QUESTIONS 1 & 2: 0
1. LITTLE INTEREST OR PLEASURE IN DOING THINGS: NOT AT ALL
2. FEELING DOWN, DEPRESSED OR HOPELESS: NOT AT ALL

## 2023-11-25 NOTE — ANESTHESIA POSTPROCEDURE EVALUATION
Patient: Indira Solomon    Procedure Summary       Date: 11/25/23 Room / Location: [facility] OR 03 / [facility]    Anesthesia Start: 0221 Anesthesia Stop:     Procedure: Appendectomy Laparoscopy Diagnosis:       Acute appendicitis with localized peritonitis, without perforation, abscess, or gangrene      (Acute appendicitis with localized peritonitis, without perforation, abscess, or gangrene [K35.30])    Surgeons: Ji Givens MD Responsible Provider: Jarocho Ratliff MD    Anesthesia Type: general ASA Status: 3 - Emergent            Anesthesia Type: general    Vitals Value Taken Time   /94 11/25/23 0337   Temp 37.7 11/25/23 0337   Pulse 99 11/25/23 0337   Resp 18 11/25/23 0337   SpO2 96 11/25/23 0337       Anesthesia Post Evaluation    Patient location during evaluation: PACU  Patient participation: complete - patient participated  Level of consciousness: awake and alert  Pain score: 0  Pain management: adequate  Airway patency: patent  Cardiovascular status: acceptable and hemodynamically stable  Respiratory status: acceptable and face mask  Hydration status: acceptable  Postoperative Nausea and Vomiting: none        There were no known notable events for this encounter.

## 2023-11-25 NOTE — H&P
History Of Present Illness  Indira Solomon is a 53 y.o. female presenting with abdominal pain.  On gi evening, around 9 PM, 2023, the patient developed the acute onset of right lower quadrant abdominal pain which  has been persistent and progressive.  She denies any and all other symptoms including fever chills sweats nausea vomiting diarrhea or constipation.    Laboratory workup revealed satisfactory chemistries except for a sodium of 135 and a alkaline phosphatase of 112 and a glucose of 104.  CBC and differential revealed a mild leukocytosis to 12.5 with a left shift.    CT scans performed the abdomen pelvis with IV contrast.  This revealed a distended appendix with at least 2 appendicoliths, and periappendiceal inflammation, consistent with appendicitis.    Patient does have a history of a  x 2 through Pfannenstiel incision and previous laparoscopic cholecystectomy.    The patient and her  live in a house in [place].     Past Medical History  Past Medical History:   Diagnosis Date    Hyperlipidemia     Hypertension     Laceration without foreign body, right foot, initial encounter 10/14/2015    Laceration of foot, right    Other microscopic hematuria 2018    Microscopic hematuria    Primary osteoarthritis of right knee 2023    Tinnitus, bilateral 2019    Tinnitus of both ears    Tubular adenoma 2020       Surgical History  Past Surgical History:   Procedure Laterality Date     SECTION, CLASSIC  10/07/2013     Section    CHOLECYSTECTOMY  2017    COLONOSCOPY  2020    HYSTEROSCOPY  10/07/2013    Hysteroscopy With Endometrial Ablation    TUBAL LIGATION Bilateral 2020        Social History  She reports that she has quit smoking. Her smoking use included cigarettes. She has been exposed to tobacco smoke. She has never used smokeless tobacco. No history on file for alcohol use and drug use.    Family History  Family History    Problem Relation Name Age of Onset    Diabetes Father      Hypertension Father          Essential    Asthma Son      Cancer Mother's Brother          Allergies  Patient has no known allergies.    Review of Systems  Review of Systems    General: Not Present- Obesity, Cancer, HIV, MRSA, Recent Cold/Flu, Tired During the Day and VRE.  HEENT: Not Present- Migraine, Cataracts, Glaucoma, Macular Degeneration and Retinal Detachment.  Respiratory: Not Present- Asthma, Chronic Cough, Difficulty Breathing on Exertion, Difficulty Breathing at Rest, Emphysema, Frequent Bronchitis, Home CPAP/BiPAP, Home Oxygen, Pulmonary Embolus, Pneumonia/TB, Sleep Apnea and Snoring.  Cardiovascular: Not Present- Chest Pain, Congestive Heart Failure, Heart Attack, Coronary Artery Disease, Heart Stent, High Cholesterol/Lipids, Hypertension, Internal Defibrillator, Irregular Heart Beat, Mitral Valve Prolapse, Murmur, Pacemaker and Peripheral Vascular Disease.  Gastrointestinal: Not Present- Heartburn, Hepatitis, Hiatal Hernia, Jaundice, Reflux, Stomach Ulcer and IBS.  Female Genitourinary: Not Present- Kidney Failure, Kidney Stones, Dialysis and Urinary Tract Infection.  Musculoskeletal: Not Present- Arthritis, Back Pain and Fibromyalgia.  Neurological: Not Present- Headaches, Numbness, Tingling, Seizures, Stroke,  Shunt and Weakness.  Psychiatric: Not Present- Anxiety, Bipolar, Depression and Panic Attacks.  Endocrine: Not Present- Diabetes, Hyperthyroidism, Hypothyroidism and Low Blood Sugar.  Hematology: Not Present- Abnormal Bleeding, Anemia and Blood Clots.      Physical Exam  General Complete Physical Exam    The physical exam findings are as follows:    General Appearance - Cooperative and Well groomed. Not in acute distress. Build & Nutrition - Well nourished.  Posture - Normal posture. Gait - Normal. Hydration - Well hydrated.   Obese.    Integumentary  General Characteristics: Overall examination of the patient's skin reveals - no  rashes, no suspicious lesions, no bruises and no evidence of scars. Color - normal coloration of skin. Skin Moisture - normal skin moisture. Temperature - normal  warmth is noted. Texture - normal skin texture.    Head and Neck  Head - normocephalic, atraumatic with no lesions or palpable masses.  Neck  Global Assessment - full range of motion. no bruit auscultated on the right, no bruit auscultated on the left, non-tender, no lymphadenopathy, no palpable mass on the right and no palpable mass on the left.  Trachea - midline.  Thyroid Gland Characteristics - no palpable nodules, normal position, symmetric and smooth.    Eyes  Sclera/Conjunctiva - Bilateral - Normal. Pupil - Bilateral - Accommodating, Direct reaction to light normal and Equal.    ENMT  Global Assessment  Upon examination of the ears, nose, mouth and throat - examination of the oral cavity reveals normal lips, teeth, gums and oral mucosa and hard and soft palates, tongue, tonsils and posterior pharynx are moist and symmetric without lesions.    Chest and Lung Exam  Inspection: Shape - Symmetric. Movements - Symmetrical. Accessory muscles - No use of accessory muscles in breathing.  Percussion:  Quality and Intensity: - Percussion normal.  Palpation: - Palpation normal.  Auscultation:  Breath sounds: - Normal and equal bilaterally.  Adventitious sounds: - none bilaterally.    Cardiovascular  Inspection: Carotid artery - Bilateral - Inspection Normal.  Palpation/Percussion: Examination by palpation and percussion reveals - No Thrills.  Cardiac Borders - Normal.  Auscultation: Rhythm - Regular. Rate: Regular.  Heart Sounds - Normal heart sounds, S1 WNL and S2 WNL.  Murmurs & Other Heart Sounds: Auscultation of the heart reveals - No Murmurs or other extra heart sounds.    Abdomen  Inspection: Inspection of the abdomen reveals - No Hernias.  Palpation/Percussion: Palpation and Percussion of the abdomen reveal -tenderness at McBurney's point right lower  "quadrant and No Palpable abdominal  masses.  Liver: - Normal.  Spleen: - Normal.  Auscultation: Auscultation of the abdomen reveals - Bowel sounds normal.  Surgical scars: Upper scopic and Pfannenstiel  Obese abdomen with large redundant pannus     Inguinal  No inguinal or femoral hernias or lymphadenopathy bilaterally.    Rectal - Did not examine.    Peripheral Vascular  Lower Extremity: Inspection - Bilateral - No Varicose veins.  Palpation: Edema - Bilateral - No edema.    Musculoskeletal  Global Assessment  Right Upper Extremity - no deformities, masses or tenderness, no known fractures, normal strength and tone and  normal range of motion without pain. Left Upper Extremity - no deformities, masses or tenderness, no known fractures,  normal strength and tone and normal range of motion without pain. Right Lower Extremity - no deformities, masses or  tenderness, no known fractures, normal strength and tone and normal range of motion without pain. Left Lower  Extremity - no deformities, masses or tenderness, no known fractures, normal strength and tone and normal range of  motion without pain.    Lymphatic  Head & Neck  General Head & Neck Lymphatics:  Bilateral: Description - Normal.  Axillary  General Axillary Region:  Bilateral: Description - Normal.  Femoral & Inguinal  Generalized Femoral & Inguinal Lymphatics:  Bilateral: Description - Normal.  Last Recorded Vitals  Blood pressure 136/66, pulse 109, temperature 37 °C (98.6 °F), resp. rate 20, height 1.6 m (5' 2.99\"), weight 108 kg (238 lb 1.6 oz), SpO2 96 %.    Relevant Results             Assessment/Plan   Principal Problem:    Acute appendicitis with localized peritonitis, without perforation, abscess, or gangrene      Plan:    Patient will be placed on IV fluids, antibiotics, analgesics and anti-emetics as indicated.  The patient will be taken to the operating room for a laparoscopic, possible open, appendectomy.    Laparoscopic Appendectomy Consent: The " procedure was explained to the patient in detail, including the risks, benefits and alternatives. The risks include, but not limited to, infection, abscess, bleeding, need for transfusion, hematoma, seroma, poor wound healing, incisional hernia, conversion to an open appendectomy, need for further surgery, postoperative bowel obstruction, intestinal leak and fistula.  The patient agreed with the plan and signed the electronic consent.         Ji Givens MD

## 2023-11-25 NOTE — ANESTHESIA PROCEDURE NOTES
Airway  Date/Time: 11/25/2023 2:26 AM  Urgency: elective      Staffing  Performed: CRNA   Authorized by: Jarocho Ratliff MD    Performed by: MACARENA Michael-BREN  Patient location during procedure: OR    Indications and Patient Condition  Indications for airway management: anesthesia  Spontaneous Ventilation: absent  Sedation level: deep  Preoxygenated: yes  Patient position: sniffing  Mask difficulty assessment: 1 - vent by mask    Final Airway Details  Final airway type: endotracheal airway      Successful airway: ETT  Cuffed: yes   Successful intubation technique: video laryngoscopy  Facilitating devices/methods: intubating stylet  Endotracheal tube insertion site: oral  Blade: Edward  Blade size: #4  ETT size (mm): 7.5  Cormack-Lehane Classification: grade I - full view of glottis  Placement verified by: chest auscultation and capnometry   Measured from: lips  ETT to lips (cm): 20  Number of attempts at approach: 1  Ventilation between attempts: none  Number of other approaches attempted: 0    Additional Comments  Elective Celis mac 4 utilized without difficulty

## 2023-11-25 NOTE — OP NOTE
Appendectomy Laparoscopy Operative Note     Date: 2023  OR Location: [facility]    Name: Indira Solomon, : 1970, Age: 53 y.o., MRN: [id number], Sex: female    Diagnosis  Pre-op Diagnosis     * Acute appendicitis with localized peritonitis, without perforation, abscess, or gangrene [K35.30] same     Procedures  Laparoscopic appendectomy    Surgeons      * Ji Givens - Primary    Resident/Fellow/Other Assistant:  Surgeon(s) and Role: Chauncey Sevilla SA    Procedure Summary  Anesthesia: General  ASA: ASA status not filed in the log.  Anesthesia Staff: Anesthesiologist: (Unknown)  Anesthesia Staff: (Unknown)  Estimated Blood Loss: 25mL  Intra-op Medications: * Intraprocedure medication information is unavailable because the case start and end events have not been set *      Intraprocedure I/O Totals       None           Specimen: see below    Staff:   Circulator: (Unknown)  Scrub Person: (Unknown)         Drains and/or Catheters: * None in log *    Tourniquet Times:         Implants:     Findings: See below    Indications: Indira Solomon is an 53 y.o. female who is having surgery for Acute appendicitis with localized peritonitis, without perforation, abscess, or gangrene [K35.30].     The patient was seen in the preoperative area. The risks, benefits, complications, treatment options, non-operative alternatives, expected recovery and outcomes were discussed with the patient. The possibilities of reaction to medication, pulmonary aspiration, injury to surrounding structures, bleeding, recurrent infection, the need for additional procedures, failure to diagnose a condition, and creating a complication requiring transfusion or operation were discussed with the patient. The patient concurred with the proposed plan, giving informed consent.  The site of surgery was properly noted/marked if necessary per policy. The patient has been actively warmed in preoperative area. Preoperative antibiotics have been ordered  and given within 2 hours of incision. Venous thrombosis prophylaxis have been ordered including bilateral sequential compression devices    Procedure Details:     Findings:  The terminal ileum and omentum were adhered to the inflamed appendix in the right lower quadrant; there is free fluid in the right gutter; there is exudate along the bowel loops; there did not appear to be a gross perforation of the appendix however a microperforation cannot be ruled out; the mesoappendix was quite thickened and inflamed; there were omental adhesions to the peritoneum near the umbilicus    Specimens:  Appendix    Procedure:    The patient was taken to the operating room and placed on the table in the supine position. Preoperative huddle was accomplished with the OR team and the patient.  Satisfactory general endotracheal anesthesia was achieved by the anesthesia service. A Zelaya catheter was placed sterilely in the urinary bladder. The Zelaya catheter was removed at the conclusion of the operation.  The left arm was tucked. The abdomen was then widely prepared and draped in the normal sterile fashion. After the appropriate timeout, the case commenced.    A 2 cm supraumbilical curvilinear incision was made along her previous scar, and the skin and subcutaneous tissues were divided down to the infraumbilical fascia, which was elevated. A 10 mm transverse incision was made in the infraumbilical fascia, and access was obtained to the peritoneal cavity. The [place] trocar was placed through this fascial defect and secured with the balloon.  Pneumoperitoneum was achieved in the normal fashion. The patient was placed in Trendelenburg position & tilted the left. The laparoscope was inserted. Under direct vision, 5 mm suprapubic and left lower quadrant trochars were placed. The above findings were noted.    The bowel loops are bluntly and gently dissected free.  The appendix was identified grasped and elevated. A window was made between  the appendix and the mesoappendix.  The mesoappendix was divided using a multiple firings of a 45 mm linear vascular endoscopic stapling device. In a similar fashion, the appendix was divided at its' base using a 45 mm linear endoscopic tissue stapling device. The appendix was placed in an Endobag, and removed through the umbilical fascial defect without difficulty, and sent to pathology as a specimen.    The right lower quadrant was copiously irrigated and suctioned. There was no evidence of bleeding or leak. The staple lines were dry. Under direct vision, the trochars were removed and there was no evidence of bleeding. The pneumoperitoneum was evacuated. The infraumbilical fascial defect was approximated using interrupted figure-of-eight 0 Vicryl sutures. Subcutaneous tissues at the site were approximated using interrupted 3-0 Vicryl sutures. The skin at each incision was approximated using running 4-0 undyed subcuticular Vicryl sutures. The wounds were cleaned and dried, benzoin and Steri-Strips were placed, followed by dry sterile bio-occlusive dressings.    The patient tolerated the procedure well. Sponge, needle, and instrument counts were correct times 2. Total IVF were  1000 cc of crystalloid, EBL was 25 cc, and urine output 500 cc over 35 minutes.  The patient was extubated in the operating room, and taken to the PACU with stable vital signs and in excellent condition.    Ji Givens M.D.  Complications:  None; patient tolerated the procedure well.    Disposition: PACU - hemodynamically stable.  Condition: stable         Additional Details: none    Attending Attestation: I performed the procedure.    Ji Givens  Phone Number: [phone]

## 2023-11-25 NOTE — ED PROVIDER NOTES
HPI   Chief Complaint   Patient presents with    Abdominal Pain     Pt comes into ed via private auto. Pt states RLQ pain x1 day pt states pain began to radiate to groin region today. Pt denies N/V/D, cp and sob        Presents with right lower quadrant pain x 1 day.  Constant.  States that she has had this in the past, usually goes away.  Largely denies any other associated symptoms.  Denies fevers, chills, nausea, vomiting, diarrhea, blood in stool, constipation, and urinary symptoms.  Does state that she is status cholecystectomy several years ago.      History provided by:  Patient   used: No                        No data recorded                Patient History   Past Medical History:   Diagnosis Date    Hyperlipidemia     Hypertension     Laceration without foreign body, right foot, initial encounter 10/14/2015    Laceration of foot, right    Other microscopic hematuria 2018    Microscopic hematuria    Primary osteoarthritis of right knee 2023    Tinnitus, bilateral 2019    Tinnitus of both ears    Tubular adenoma 2020     Past Surgical History:   Procedure Laterality Date     SECTION, CLASSIC  10/07/2013     Section    CHOLECYSTECTOMY  2017    COLONOSCOPY  2020    HYSTEROSCOPY  10/07/2013    Hysteroscopy With Endometrial Ablation    TUBAL LIGATION Bilateral 2020     Family History   Problem Relation Name Age of Onset    Diabetes Father      Hypertension Father          Essential    Asthma Son      Cancer Mother's Brother       Social History     Tobacco Use    Smoking status: Former     Types: Cigarettes     Passive exposure: Past    Smokeless tobacco: Never   Substance Use Topics    Alcohol use: Not on file    Drug use: Not on file       Physical Exam   ED Triage Vitals [23 193]   Temp Heart Rate Resp BP   37 °C (98.6 °F) 102 18 176/85      SpO2 Temp src Heart Rate Source Patient Position   95 % -- -- --      BP Location  FiO2 (%)     -- --       Physical Exam  Vitals and nursing note reviewed.   HENT:      Head: Atraumatic.      Mouth/Throat:      Mouth: Mucous membranes are moist.   Eyes:      Conjunctiva/sclera: Conjunctivae normal.   Cardiovascular:      Rate and Rhythm: Normal rate and regular rhythm.   Pulmonary:      Effort: Pulmonary effort is normal.   Abdominal:      Palpations: Abdomen is soft.      Tenderness: There is abdominal tenderness (Tenderness palpation particular in the right lower quadrant.  No guarding.  No rebound.).   Genitourinary:     Comments: No CVA tenderness.  No suprapubic tenderness.  Musculoskeletal:         General: No deformity.      Cervical back: Normal range of motion.   Skin:     General: Skin is warm and dry.      Comments: No rashes or skin changes noted on the abdomen or under the pannus.   Neurological:      Mental Status: She is alert.      Comments: Moving all extremities         ED Course & MDM   ED Course as of 11/25/23 0048 Fri Nov 24, 2023   2154 CT abdomen pelvis w IV contrast  Per my view, patient has appendicitis.  Will reach out to surgery. [AB]      ED Course User Index  [AB] Steven Suarez MD         Diagnoses as of 11/25/23 0048   Acute appendicitis       Medical Decision Making  Past medical history includes hypertension, hyperlipidemia, and cholecystectomy presents with right-sided abdominal pain.    CT with acute appendicitis.  Order placed for Zosyn.  Surgery consulted.  Will escalate care to hospitalization for consideration of major surgery.    Amount and/or Complexity of Data Reviewed  Labs: ordered.  Radiology: ordered and independent interpretation performed. Decision-making details documented in ED Course.  Discussion of management or test interpretation with external provider(s): The general surgeon on-call, Dr. Givens.    Risk  Decision regarding hospitalization.  Emergency major surgery.        Procedure  Procedures     Steven Suarez MD  11/25/23 0048

## 2023-11-25 NOTE — DISCHARGE INSTRUCTIONS
1.  May discharge the patient to home.  2.  Diet:  No Restrictions  3.  Activity: As tolerated;  no lifting > 15 lbs.    4.  May shower: plastic dressing and/or incision (including steri-strips) may get wet  5.  Dressing changes:  Remove clear plastic dressing and gauze on postoperative day #2; may then keep incision open to air; steri-strips will peel off gradually in 7-10 days  6.  Driving: May drive when no longer using narcotic analgesics   7.  Follow-up: Please call my office ([phone]) and make a follow-up appointment be seen in 2 weeks  8.  For postoperative analgesia/pain relief, I recommend: Tylenol Extra Strength 500 mg with ibuprofen 600 mg (three, 200 mg Advil or Motrin tablets ) 4 times a day with food, as needed, as needed for mild to moderate pain

## 2023-11-25 NOTE — ANESTHESIA PREPROCEDURE EVALUATION
Patient: Indira Solomon    Procedure Information       Date/Time: 11/25/23 0130    Procedure: Appendectomy Laparoscopy    Location: [facility] / [facility]    Surgeons: Ji Givens MD            Relevant Problems   Cardiovascular   (+) Benign essential HTN   (+) Mixed hyperlipidemia      Endocrine   (+) Class 3 severe obesity in adult (CMS/HCC)      Musculoskeletal   (+) Primary osteoarthritis of right knee       Clinical information reviewed:   Tobacco  Allergies  Meds  Problems  Med Hx  Surg Hx   Fam Hx  Soc   Hx        NPO Detail:  No data recorded     Physical Exam    Airway  Mallampati: IV  TM distance: <3 FB  Neck ROM: full     Cardiovascular - normal exam  Rhythm: regular  Rate: normal     Dental - normal exam     Pulmonary   Breath sounds clear to auscultation  (+) decreased breath sounds     Abdominal   (+) obese  Abdomen: tender             Anesthesia Plan    ASA 3 - emergent     general     The patient is not a current smoker.    intravenous induction   Postoperative administration of opioids is intended.  Trial extubation is planned.  Anesthetic plan and risks discussed with patient and spouse.  Use of blood products discussed with patient and spouse who consented to blood products.    Plan discussed with CRNA.

## 2023-11-26 LAB
ANION GAP SERPL CALC-SCNC: 12 MMOL/L (ref 10–20)
BUN SERPL-MCNC: 17 MG/DL (ref 6–23)
CALCIUM SERPL-MCNC: 8.5 MG/DL (ref 8.6–10.3)
CHLORIDE SERPL-SCNC: 104 MMOL/L (ref 98–107)
CO2 SERPL-SCNC: 27 MMOL/L (ref 21–32)
CREAT SERPL-MCNC: 0.87 MG/DL (ref 0.5–1.05)
ERYTHROCYTE [DISTWIDTH] IN BLOOD BY AUTOMATED COUNT: 13.2 % (ref 11.5–14.5)
GFR SERPL CREATININE-BSD FRML MDRD: 79 ML/MIN/1.73M*2
GLUCOSE SERPL-MCNC: 101 MG/DL (ref 74–99)
HCT VFR BLD AUTO: 34.2 % (ref 36–46)
HGB BLD-MCNC: 11.4 G/DL (ref 12–16)
MCH RBC QN AUTO: 29.5 PG (ref 26–34)
MCHC RBC AUTO-ENTMCNC: 33.3 G/DL (ref 32–36)
MCV RBC AUTO: 88 FL (ref 80–100)
NRBC BLD-RTO: 0 /100 WBCS (ref 0–0)
PLATELET # BLD AUTO: 195 X10*3/UL (ref 150–450)
POTASSIUM SERPL-SCNC: 3.5 MMOL/L (ref 3.5–5.3)
RBC # BLD AUTO: 3.87 X10*6/UL (ref 4–5.2)
SODIUM SERPL-SCNC: 139 MMOL/L (ref 136–145)
WBC # BLD AUTO: 17.2 X10*3/UL (ref 4.4–11.3)

## 2023-11-26 PROCEDURE — 2500000001 HC RX 250 WO HCPCS SELF ADMINISTERED DRUGS (ALT 637 FOR MEDICARE OP): Performed by: SURGERY

## 2023-11-26 PROCEDURE — 1100000001 HC PRIVATE ROOM DAILY

## 2023-11-26 PROCEDURE — 2500000004 HC RX 250 GENERAL PHARMACY W/ HCPCS (ALT 636 FOR OP/ED): Performed by: SURGERY

## 2023-11-26 PROCEDURE — 2500000001 HC RX 250 WO HCPCS SELF ADMINISTERED DRUGS (ALT 637 FOR MEDICARE OP): Performed by: NURSE PRACTITIONER

## 2023-11-26 PROCEDURE — 99024 POSTOP FOLLOW-UP VISIT: CPT | Performed by: SURGERY

## 2023-11-26 PROCEDURE — 99231 SBSQ HOSP IP/OBS SF/LOW 25: CPT | Performed by: NURSE PRACTITIONER

## 2023-11-26 PROCEDURE — 2500000004 HC RX 250 GENERAL PHARMACY W/ HCPCS (ALT 636 FOR OP/ED): Performed by: NURSE PRACTITIONER

## 2023-11-26 PROCEDURE — 85027 COMPLETE CBC AUTOMATED: CPT | Performed by: NURSE PRACTITIONER

## 2023-11-26 PROCEDURE — 36415 COLL VENOUS BLD VENIPUNCTURE: CPT | Performed by: NURSE PRACTITIONER

## 2023-11-26 PROCEDURE — 80048 BASIC METABOLIC PNL TOTAL CA: CPT | Performed by: NURSE PRACTITIONER

## 2023-11-26 PROCEDURE — 96372 THER/PROPH/DIAG INJ SC/IM: CPT | Performed by: SURGERY

## 2023-11-26 RX ORDER — ROSUVASTATIN CALCIUM 10 MG/1
10 TABLET, COATED ORAL DAILY
Status: DISCONTINUED | OUTPATIENT
Start: 2023-11-26 | End: 2023-11-27 | Stop reason: HOSPADM

## 2023-11-26 RX ORDER — DOCUSATE SODIUM 100 MG/1
100 CAPSULE, LIQUID FILLED ORAL 2 TIMES DAILY
Status: DISCONTINUED | OUTPATIENT
Start: 2023-11-26 | End: 2023-11-27 | Stop reason: HOSPADM

## 2023-11-26 RX ADMIN — FAMOTIDINE 20 MG: 20 TABLET ORAL at 09:13

## 2023-11-26 RX ADMIN — PIPERACILLIN SODIUM AND TAZOBACTAM SODIUM 3.38 G: 3; .375 INJECTION, SOLUTION INTRAVENOUS at 12:12

## 2023-11-26 RX ADMIN — HEPARIN SODIUM 7500 UNITS: 5000 INJECTION INTRAVENOUS; SUBCUTANEOUS at 21:01

## 2023-11-26 RX ADMIN — ROSUVASTATIN CALCIUM 10 MG: 10 TABLET, FILM COATED ORAL at 12:11

## 2023-11-26 RX ADMIN — OXYCODONE HYDROCHLORIDE 5 MG: 5 TABLET ORAL at 21:06

## 2023-11-26 RX ADMIN — PIPERACILLIN SODIUM AND TAZOBACTAM SODIUM 3.38 G: 3; .375 INJECTION, SOLUTION INTRAVENOUS at 00:33

## 2023-11-26 RX ADMIN — LISINOPRIL: 20 TABLET ORAL at 12:11

## 2023-11-26 RX ADMIN — OXYCODONE HYDROCHLORIDE 5 MG: 5 TABLET ORAL at 09:14

## 2023-11-26 RX ADMIN — PIPERACILLIN SODIUM AND TAZOBACTAM SODIUM 3.38 G: 3; .375 INJECTION, SOLUTION INTRAVENOUS at 18:16

## 2023-11-26 RX ADMIN — DOCUSATE SODIUM 100 MG: 100 CAPSULE, LIQUID FILLED ORAL at 12:11

## 2023-11-26 RX ADMIN — HEPARIN SODIUM 7500 UNITS: 5000 INJECTION INTRAVENOUS; SUBCUTANEOUS at 06:26

## 2023-11-26 RX ADMIN — HEPARIN SODIUM 7500 UNITS: 5000 INJECTION INTRAVENOUS; SUBCUTANEOUS at 13:57

## 2023-11-26 RX ADMIN — PIPERACILLIN SODIUM AND TAZOBACTAM SODIUM 3.38 G: 3; .375 INJECTION, SOLUTION INTRAVENOUS at 06:26

## 2023-11-26 RX ADMIN — OXYCODONE HYDROCHLORIDE 5 MG: 5 TABLET ORAL at 01:17

## 2023-11-26 RX ADMIN — FAMOTIDINE 20 MG: 10 INJECTION, SOLUTION INTRAVENOUS at 21:01

## 2023-11-26 RX ADMIN — OXYCODONE HYDROCHLORIDE 5 MG: 5 TABLET ORAL at 15:14

## 2023-11-26 ASSESSMENT — PAIN DESCRIPTION - ORIENTATION: ORIENTATION: RIGHT;LEFT

## 2023-11-26 ASSESSMENT — PAIN - FUNCTIONAL ASSESSMENT
PAIN_FUNCTIONAL_ASSESSMENT: 0-10
PAIN_FUNCTIONAL_ASSESSMENT: 0-10

## 2023-11-26 ASSESSMENT — PAIN SCALES - GENERAL
PAINLEVEL_OUTOF10: 6
PAINLEVEL_OUTOF10: 7
PAINLEVEL_OUTOF10: 6
PAINLEVEL_OUTOF10: 6

## 2023-11-26 NOTE — PROGRESS NOTES
"Indira Solomon is a 53 y.o. female on day 2 of admission presenting with Acute appendicitis with localized peritonitis, without perforation, abscess, or gangrene.    Subjective   Patient is without acute complaints. States she is feeling well. Tolerable abd pain. Tolerating diet. +flatus, no BM since OR. WBC uptrending       Objective     Physical Exam  Constitutional:       Appearance: Normal appearance. She is normal weight.   HENT:      Head: Normocephalic and atraumatic.      Mouth/Throat:      Mouth: Mucous membranes are moist.   Cardiovascular:      Rate and Rhythm: Normal rate and regular rhythm.   Pulmonary:      Effort: Pulmonary effort is normal.      Breath sounds: Normal breath sounds.   Abdominal:      General: Abdomen is flat. Bowel sounds are normal.      Palpations: Abdomen is soft.      Tenderness: There is no abdominal tenderness.      Comments: Surgical incisions c/d/i   Skin:     General: Skin is warm and dry.   Neurological:      General: No focal deficit present.      Mental Status: She is alert and oriented to person, place, and time.   Psychiatric:         Mood and Affect: Mood normal.         Last Recorded Vitals  Blood pressure 130/59, pulse 80, temperature 35.7 °C (96.3 °F), temperature source Temporal, resp. rate 18, height 1.6 m (5' 2.99\"), weight 108 kg (238 lb 1.6 oz), SpO2 95 %.  Intake/Output last 3 Shifts:  I/O last 3 completed shifts:  In: 2657 (24.6 mL/kg) [P.O.:1057; I.V.:400 (3.7 mL/kg); IV Piggyback:1200]  Out: 525 (4.9 mL/kg) [Urine:500 (0.1 mL/kg/hr); Blood:25]  Weight: 108 kg     Relevant Results              Results for orders placed or performed during the hospital encounter of 11/24/23 (from the past 24 hour(s))   Basic Metabolic Panel   Result Value Ref Range    Glucose 101 (H) 74 - 99 mg/dL    Sodium 139 136 - 145 mmol/L    Potassium 3.5 3.5 - 5.3 mmol/L    Chloride 104 98 - 107 mmol/L    Bicarbonate 27 21 - 32 mmol/L    Anion Gap 12 10 - 20 mmol/L    Urea Nitrogen 17 6 " - 23 mg/dL    Creatinine 0.87 0.50 - 1.05 mg/dL    eGFR 79 >60 mL/min/1.73m*2    Calcium 8.5 (L) 8.6 - 10.3 mg/dL   CBC   Result Value Ref Range    WBC 17.2 (H) 4.4 - 11.3 x10*3/uL    nRBC 0.0 0.0 - 0.0 /100 WBCs    RBC 3.87 (L) 4.00 - 5.20 x10*6/uL    Hemoglobin 11.4 (L) 12.0 - 16.0 g/dL    Hematocrit 34.2 (L) 36.0 - 46.0 %    MCV 88 80 - 100 fL    MCH 29.5 26.0 - 34.0 pg    MCHC 33.3 32.0 - 36.0 g/dL    RDW 13.2 11.5 - 14.5 %    Platelets 195 150 - 450 x10*3/uL                      Assessment/Plan   Principal Problem:    Acute appendicitis with localized peritonitis, without perforation, abscess, or gangrene  Active Problems:    Acute appendicitis    Class 3 severe obesity in adult (CMS/HCC)    This is a 53 year old female with PMH significant for HTN and HLD who presented to [facility] ED 11/24 for abd pain. CT a/p significant for acute appendicitis. Patient admitted to surgery for continued management.     Procedures:  S/p lap appy 11/25 with Dr. Givens    Assessment/Plan:    # Acute appendicitis  # Leukocytosis  - continue Zosyn Q6H  - multimodal pain control  - regular diet  - bowel regimen  - am labs    # HTN  # HLD  - resume home meds    # DVT ppx  - Heparin 5000 units Q8H    Dispo: plan for discharge home on PO antibiotics when WBC downtrending, likely tomorrow    Patient seen and discussed with Dr. Givens, plan as above.       I spent 15 minutes in the professional and overall care of this patient.      Brielle Jason, MACARENA-CNP

## 2023-11-27 VITALS
WEIGHT: 238.1 LBS | TEMPERATURE: 96.6 F | BODY MASS INDEX: 42.19 KG/M2 | HEART RATE: 87 BPM | OXYGEN SATURATION: 93 % | RESPIRATION RATE: 18 BRPM | SYSTOLIC BLOOD PRESSURE: 130 MMHG | HEIGHT: 63 IN | DIASTOLIC BLOOD PRESSURE: 81 MMHG

## 2023-11-27 PROBLEM — K35.30 ACUTE APPENDICITIS WITH LOCALIZED PERITONITIS, WITHOUT PERFORATION, ABSCESS, OR GANGRENE: Status: RESOLVED | Noted: 2023-11-24 | Resolved: 2023-11-27

## 2023-11-27 PROBLEM — K35.80 ACUTE APPENDICITIS: Status: RESOLVED | Noted: 2023-11-24 | Resolved: 2023-11-27

## 2023-11-27 LAB
B-LACTAMASE ORGANISM ISLT: POSITIVE
BACTERIA SPEC CULT: ABNORMAL
BACTERIA SPEC CULT: ABNORMAL
ERYTHROCYTE [DISTWIDTH] IN BLOOD BY AUTOMATED COUNT: 13.1 % (ref 11.5–14.5)
GRAM STN SPEC: ABNORMAL
GRAM STN SPEC: ABNORMAL
HCT VFR BLD AUTO: 34.2 % (ref 36–46)
HGB BLD-MCNC: 11.4 G/DL (ref 12–16)
MCH RBC QN AUTO: 29.2 PG (ref 26–34)
MCHC RBC AUTO-ENTMCNC: 33.3 G/DL (ref 32–36)
MCV RBC AUTO: 88 FL (ref 80–100)
NRBC BLD-RTO: 0 /100 WBCS (ref 0–0)
PLATELET # BLD AUTO: 193 X10*3/UL (ref 150–450)
RBC # BLD AUTO: 3.9 X10*6/UL (ref 4–5.2)
WBC # BLD AUTO: 11.2 X10*3/UL (ref 4.4–11.3)

## 2023-11-27 PROCEDURE — 99024 POSTOP FOLLOW-UP VISIT: CPT | Performed by: SURGERY

## 2023-11-27 PROCEDURE — 2500000001 HC RX 250 WO HCPCS SELF ADMINISTERED DRUGS (ALT 637 FOR MEDICARE OP): Performed by: REGISTERED NURSE

## 2023-11-27 PROCEDURE — 2500000004 HC RX 250 GENERAL PHARMACY W/ HCPCS (ALT 636 FOR OP/ED): Performed by: SURGERY

## 2023-11-27 PROCEDURE — 2500000004 HC RX 250 GENERAL PHARMACY W/ HCPCS (ALT 636 FOR OP/ED): Performed by: NURSE PRACTITIONER

## 2023-11-27 PROCEDURE — 2500000001 HC RX 250 WO HCPCS SELF ADMINISTERED DRUGS (ALT 637 FOR MEDICARE OP): Performed by: NURSE PRACTITIONER

## 2023-11-27 PROCEDURE — 36415 COLL VENOUS BLD VENIPUNCTURE: CPT | Performed by: NURSE PRACTITIONER

## 2023-11-27 PROCEDURE — 96372 THER/PROPH/DIAG INJ SC/IM: CPT | Performed by: SURGERY

## 2023-11-27 PROCEDURE — 2500000001 HC RX 250 WO HCPCS SELF ADMINISTERED DRUGS (ALT 637 FOR MEDICARE OP): Performed by: SURGERY

## 2023-11-27 PROCEDURE — 99238 HOSP IP/OBS DSCHRG MGMT 30/<: CPT | Performed by: REGISTERED NURSE

## 2023-11-27 PROCEDURE — 85027 COMPLETE CBC AUTOMATED: CPT | Performed by: NURSE PRACTITIONER

## 2023-11-27 RX ORDER — LEVOFLOXACIN 500 MG/1
500 TABLET, FILM COATED ORAL
Qty: 5 TABLET | Refills: 0 | Status: SHIPPED | OUTPATIENT
Start: 2023-11-27 | End: 2023-12-02

## 2023-11-27 RX ORDER — LEVOFLOXACIN 250 MG/1
500 TABLET ORAL ONCE
Status: DISCONTINUED | OUTPATIENT
Start: 2023-11-28 | End: 2023-11-27

## 2023-11-27 RX ORDER — LEVOFLOXACIN 250 MG/1
500 TABLET ORAL
Status: DISCONTINUED | OUTPATIENT
Start: 2023-11-27 | End: 2023-11-27 | Stop reason: HOSPADM

## 2023-11-27 RX ORDER — ACETAMINOPHEN 325 MG/1
1000 TABLET ORAL EVERY 6 HOURS
Qty: 84 TABLET | Refills: 0 | Status: SHIPPED | OUTPATIENT
Start: 2023-11-27 | End: 2023-12-04

## 2023-11-27 RX ORDER — LEVOFLOXACIN 500 MG/1
500 TABLET, FILM COATED ORAL ONCE
Qty: 1 TABLET | Refills: 0 | Status: CANCELLED | OUTPATIENT
Start: 2023-11-28 | End: 2023-11-28

## 2023-11-27 RX ORDER — OXYCODONE HYDROCHLORIDE 5 MG/1
5 TABLET ORAL 3 TIMES DAILY PRN
Qty: 6 TABLET | Refills: 0 | Status: SHIPPED | OUTPATIENT
Start: 2023-11-27 | End: 2023-11-29

## 2023-11-27 RX ADMIN — OXYCODONE HYDROCHLORIDE 5 MG: 5 TABLET ORAL at 09:56

## 2023-11-27 RX ADMIN — LEVOFLOXACIN 500 MG: 250 TABLET, FILM COATED ORAL at 10:59

## 2023-11-27 RX ADMIN — PIPERACILLIN SODIUM AND TAZOBACTAM SODIUM 3.38 G: 3; .375 INJECTION, SOLUTION INTRAVENOUS at 00:48

## 2023-11-27 RX ADMIN — OXYCODONE HYDROCHLORIDE 5 MG: 5 TABLET ORAL at 03:10

## 2023-11-27 RX ADMIN — LISINOPRIL: 20 TABLET ORAL at 08:14

## 2023-11-27 RX ADMIN — ROSUVASTATIN CALCIUM 10 MG: 10 TABLET, FILM COATED ORAL at 08:15

## 2023-11-27 RX ADMIN — HEPARIN SODIUM 7500 UNITS: 5000 INJECTION INTRAVENOUS; SUBCUTANEOUS at 06:16

## 2023-11-27 RX ADMIN — FAMOTIDINE 20 MG: 20 TABLET ORAL at 08:14

## 2023-11-27 RX ADMIN — PIPERACILLIN SODIUM AND TAZOBACTAM SODIUM 3.38 G: 3; .375 INJECTION, SOLUTION INTRAVENOUS at 06:18

## 2023-11-27 ASSESSMENT — COGNITIVE AND FUNCTIONAL STATUS - GENERAL
DAILY ACTIVITIY SCORE: 24
MOBILITY SCORE: 24

## 2023-11-27 ASSESSMENT — PAIN SCALES - GENERAL
PAINLEVEL_OUTOF10: 4
PAINLEVEL_OUTOF10: 6

## 2023-11-27 ASSESSMENT — PAIN - FUNCTIONAL ASSESSMENT
PAIN_FUNCTIONAL_ASSESSMENT: 0-10
PAIN_FUNCTIONAL_ASSESSMENT: 0-10

## 2023-11-27 ASSESSMENT — PAIN DESCRIPTION - LOCATION: LOCATION: ABDOMEN

## 2023-11-27 NOTE — DISCHARGE SUMMARY
Discharge Diagnosis  Acute appendicitis with localized peritonitis, without perforation, abscess, or gangrene    Issues Requiring Follow-Up  None    Test Results Pending At Discharge  Pending Labs       Order Current Status    Surgical Pathology Exam Collected (23 0309)    Extra Tubes In process    SST TOP In process    Tissue/Wound Culture/Smear Preliminary result            Hospital Course  Indira Solomon is a 53 y.o. female presenting with abdominal pain.  On  evening, around 9 PM, 2023, the patient developed the acute onset of right lower quadrant abdominal pain which  has been persistent and progressive.  She denies any and all other symptoms including fever chills sweats nausea vomiting diarrhea or constipation.     Laboratory workup revealed satisfactory chemistries except for a sodium of 135 and a alkaline phosphatase of 112 and a glucose of 104.  CBC and differential revealed a mild leukocytosis to 12.5 with a left shift.     CT scans performed the abdomen pelvis with IV contrast.  This revealed a distended appendix with at least 2 appendicoliths, and periappendiceal inflammation, consistent with appendicitis.     Patient does have a history of a  x 2 through Pfannenstiel incision and previous laparoscopic cholecystectomy.    S/p lap appy . Post op course uncomplicated. At the time of discharge, patient's pain was controlled with oral analgesia, patient was urinating, having BMs, sleeping, and eating well. Patient was discharged home with scripts and follow up appointments. Discharge plan was discussed with the patient and all of the patient's questions were answered.    By the time of discharge, WBC normalized to 11; however, due to early gram negative cultures from OR, patient sent home with 5 day course of PO Levaquin     Pertinent Physical Exam At Time of Discharge  Physical Exam  Constitutional:       Appearance: She is obese.   HENT:      Head: Normocephalic.       Mouth/Throat:      Mouth: Mucous membranes are moist.   Eyes:      General: No scleral icterus.     Pupils: Pupils are equal, round, and reactive to light.   Cardiovascular:      Rate and Rhythm: Normal rate and regular rhythm.   Pulmonary:      Effort: Pulmonary effort is normal. No respiratory distress.      Breath sounds: Normal breath sounds. No wheezing.   Abdominal:      General: Bowel sounds are normal. There is no distension.      Palpations: Abdomen is soft.      Comments: 3 postoperative dressings in place without any shadowing or bleeding. Mild ecchymosis to abdomen only.   Musculoskeletal:         General: No swelling.   Skin:     General: Skin is warm and dry.      Coloration: Skin is not jaundiced.   Neurological:      Mental Status: She is alert and oriented to person, place, and time. Mental status is at baseline.   Psychiatric:         Mood and Affect: Mood normal.         Behavior: Behavior normal.      Comments: Very pleasant adult female. Excited to go home         Home Medications     Medication List      START taking these medications     acetaminophen 325 mg tablet; Commonly known as: Tylenol; Take 3 tablets   (975 mg) by mouth every 6 hours for 7 days.   levoFLOXacin 500 mg tablet; Commonly known as: Levaquin; Take 1 tablet   (500 mg) by mouth once every 24 hours for 5 days.   oxyCODONE 5 mg immediate release tablet; Commonly known as: Roxicodone;   Take 1 tablet (5 mg) by mouth 3 times a day as needed for severe pain (7 -   10) for up to 2 days.     CONTINUE taking these medications     lisinopriL-hydrochlorothiazide 20-12.5 mg tablet; Take 1 tablet by mouth   once daily.   rosuvastatin 10 mg tablet; Commonly known as: Crestor; Take 1 tablet (10   mg) by mouth once daily.   saccharomyces boulardii 250 mg capsule; Commonly known as: Florastor       Outpatient Follow-Up  No future appointments.    Margareth Muniz, APRN-CNP

## 2023-11-27 NOTE — PROGRESS NOTES
11/27/23 1141   Discharge Planning   Living Arrangements Spouse/significant other;Children   Support Systems Spouse/significant other;Children   Type of Residence Private residence   Number of Stairs to Enter Residence 3   Patient expects to be discharged to: Home     11/27/2023  Met with pt in room, introduced self and explained role.  Verified insurance, address, phone. Pt is from home, lives with spouse. Stated she is independent.  Daughter also lives with her and is there all the time.  Pt has support of family.  Does not feel she will have any needs upon discharge.  Charis Lozano RN [facility]

## 2023-11-28 ENCOUNTER — PATIENT OUTREACH (OUTPATIENT)
Dept: CARE COORDINATION | Facility: CLINIC | Age: 53
End: 2023-11-28
Payer: COMMERCIAL

## 2023-11-28 NOTE — PROGRESS NOTES
11/28/2023  Outreach call to patient to support a smooth transition of care from recent admission.  Spoke with patient, reviewed discharge medications, discharge instructions, assessed social needs, and provided education on importance of follow-up appointment with provider. Enrolled patient in [employer] chatbot for additional support and education through transition period.  Will continue to monitor through transition period.  Medications  Medications reviewed with patient/caregiver?: Yes (11/28/2023 12:25 PM)  Is the patient having any side effects they believe may be caused by any medication additions or changes?: No (11/28/2023 12:25 PM)  Does the patient have all medications ordered at discharge?: Yes (11/28/2023 12:25 PM)  Is the patient taking all medications as directed (includes completed medication regime)?: Yes (11/28/2023 12:25 PM)    Appointments  Does the patient have a primary care provider?: Yes (11/28/2023 12:25 PM)  Has the patient kept scheduled appointments due by today?: Yes (12/11/23) (11/28/2023 12:25 PM)    Patient Teaching  Does the patient have access to their discharge instructions?: Yes (11/28/2023 12:25 PM)  Care Management Interventions: Reviewed instructions with patient (11/28/2023 12:25 PM)  What is the patient's perception of their health status since discharge?: Improving (11/28/2023 12:25 PM)      niko

## 2023-11-29 ENCOUNTER — PATIENT MESSAGE (OUTPATIENT)
Dept: SURGERY | Facility: CLINIC | Age: 53
End: 2023-11-29
Payer: COMMERCIAL

## 2023-12-01 LAB
LABORATORY COMMENT REPORT: NORMAL
PATH REPORT.FINAL DX SPEC: NORMAL
PATH REPORT.GROSS SPEC: NORMAL
PATH REPORT.RELEVANT HX SPEC: NORMAL
PATH REPORT.TOTAL CANCER: NORMAL

## 2023-12-11 ENCOUNTER — OFFICE VISIT (OUTPATIENT)
Dept: SURGERY | Facility: CLINIC | Age: 53
End: 2023-12-11
Payer: COMMERCIAL

## 2023-12-11 VITALS
TEMPERATURE: 97.3 F | HEART RATE: 74 BPM | SYSTOLIC BLOOD PRESSURE: 146 MMHG | HEIGHT: 62 IN | BODY MASS INDEX: 44.18 KG/M2 | RESPIRATION RATE: 17 BRPM | WEIGHT: 240.1 LBS | DIASTOLIC BLOOD PRESSURE: 83 MMHG | OXYGEN SATURATION: 95 %

## 2023-12-11 DIAGNOSIS — R19.7 DIARRHEA, UNSPECIFIED TYPE: ICD-10-CM

## 2023-12-11 DIAGNOSIS — K35.30 ACUTE APPENDICITIS WITH LOCALIZED PERITONITIS, WITHOUT PERFORATION, ABSCESS, OR GANGRENE: Primary | ICD-10-CM

## 2023-12-11 PROCEDURE — 99024 POSTOP FOLLOW-UP VISIT: CPT | Performed by: SURGERY

## 2023-12-11 PROCEDURE — 3077F SYST BP >= 140 MM HG: CPT | Performed by: SURGERY

## 2023-12-11 PROCEDURE — 1036F TOBACCO NON-USER: CPT | Performed by: SURGERY

## 2023-12-11 PROCEDURE — 3079F DIAST BP 80-89 MM HG: CPT | Performed by: SURGERY

## 2023-12-11 ASSESSMENT — PAIN SCALES - GENERAL: PAINLEVEL: 1

## 2023-12-11 NOTE — PROGRESS NOTES
Post-Hospitalization/Post-Operative Office Visit  Indira Solomon presents for office follow-up after recent hospitalization and operation.  The patient was admitted to [facility] from November 25 through November 27.  On the very early morning of 11/25/2023 the patient underwent laparoscopic appendectomy for acute appendicitis.  Pathology confirmed acute appendicitis.    Her immediate postoperative course was satisfactory.  She had a persistent elevated white blood cell count.  This was normal on discharge however.  Appendix culture did reveal E. coli.  The patient was discharged on a 5-day course of daily Levaquin.    The patient's only persistent symptom is diarrhea.  She send diarrhea since her hospitalization and even after the antibiotic stopped.  Yesterday she had 5 or 6 episodes.  Otherwise she is doing quite well.  She denies fever chills or sweats nausea or vomiting or abdominal pain.  She is eating well.  Her appetite is still a bit diminished however.    She only used 2 oxycodone tablets postoperatively.  She did return to work 12/4/2023 without incident.    The patient and her  work as a 's.  She lives in [place].        Vitals  There were no vitals taken for this visit.     Exam  Post Op Abdominal Physical Exam    The physical exam findings are as follows:    General  General Appearance - Not in acute distress.    Integumentary  Abdominal Incision -laparoscopic x 3, lower abdomen including transverse supraumbilical  - dry, Intact, No evidence of infection, hematoma, or seroma, edges well-approximated.  No drainage present, no redness and no warmth to the touch.  Small 1-2 cm hematoma at laparoscopic incision subcutaneous left lower quadrant.  Resolving ecchymosis left lower quadrant.    Chest and Lung Exam  Auscultation:  Breath sounds: - Normal and equal bilaterally.    Adventitious sounds: none    Cardiovascular  Auscultation: Rhythm - Regular. Rate - Regular.  Heart Sounds -  Normal heart sounds.    Abdomen  Inspection: - Inspection Normal.  Palpation/Percussion: Palpation and Percussion of the abdomen reveal - Non Tender, No Rebound tenderness, No Rigidity (guarding) and No Palpable abdominal masses.  Liver: - Normal.  Spleen: - Normal.  Auscultation: Auscultation of the abdomen reveals - Bowel sounds normal and No Abdominal bruits.  Surgical scars:      Assessment and Plan    Mrs. Solomon has done quite well postoperatively.    Her only symptom is persistent diarrhea of uncertain etiology.  It is not unusual to have loose bowel movements immediately postoperative, but this is unusual more than 2 weeks postoperatively.    Recommendations:    Check stool for C. difficile toxin.  Will call patient with results.  If positive for C. difficile we will treat accordingly.  If negative, and the diarrhea persist, she should either see her primary physician, or I will refer to gastroenterology.    Otherwise, no dietary or activity restrictions whatsoever.    Follow-up as needed otherwise.

## 2023-12-13 ENCOUNTER — DOCUMENTATION (OUTPATIENT)
Dept: CARE COORDINATION | Facility: CLINIC | Age: 53
End: 2023-12-13
Payer: COMMERCIAL

## 2023-12-13 LAB — HOLD SPECIMEN: NORMAL

## 2023-12-13 NOTE — PROGRESS NOTES
12/13/2023   Attempted outreach to inquire how Indira follow up appointment went and if there were any questions.   Left a voice message with my contact information.  niko

## 2023-12-28 ENCOUNTER — DOCUMENTATION (OUTPATIENT)
Dept: CARE COORDINATION | Facility: CLINIC | Age: 53
End: 2023-12-28
Payer: COMMERCIAL

## 2023-12-28 NOTE — PROGRESS NOTES
12/28/2023  Attempted outreach call to patient to check in 30 days after hospital discharge to support smooth transition of care.  Unable to contact, left a voice message with my contact information.  No additional outreach needed at this time.   niko

## 2024-04-30 ASSESSMENT — PROMIS GLOBAL HEALTH SCALE
RATE_QUALITY_OF_LIFE: VERY GOOD
RATE_PHYSICAL_HEALTH: GOOD
CARRYOUT_SOCIAL_ACTIVITIES: VERY GOOD
RATE_AVERAGE_PAIN: 2
RATE_GENERAL_HEALTH: VERY GOOD
EMOTIONAL_PROBLEMS: NEVER
RATE_SOCIAL_SATISFACTION: VERY GOOD
RATE_AVERAGE_FATIGUE: MILD
CARRYOUT_PHYSICAL_ACTIVITIES: MOSTLY
RATE_MENTAL_HEALTH: EXCELLENT

## 2024-05-01 ENCOUNTER — LAB (OUTPATIENT)
Dept: LAB | Facility: LAB | Age: 54
End: 2024-05-01
Payer: COMMERCIAL

## 2024-05-01 ENCOUNTER — OFFICE VISIT (OUTPATIENT)
Dept: PRIMARY CARE | Facility: CLINIC | Age: 54
End: 2024-05-01
Payer: COMMERCIAL

## 2024-05-01 VITALS
WEIGHT: 249 LBS | BODY MASS INDEX: 45.82 KG/M2 | SYSTOLIC BLOOD PRESSURE: 120 MMHG | HEART RATE: 70 BPM | DIASTOLIC BLOOD PRESSURE: 80 MMHG | HEIGHT: 62 IN

## 2024-05-01 DIAGNOSIS — H25.9 AGE-RELATED CATARACT OF BOTH EYES, UNSPECIFIED AGE-RELATED CATARACT TYPE: ICD-10-CM

## 2024-05-01 DIAGNOSIS — E66.01 MORBID OBESITY WITH BMI OF 45.0-49.9, ADULT (MULTI): ICD-10-CM

## 2024-05-01 DIAGNOSIS — I10 BENIGN ESSENTIAL HTN: Primary | ICD-10-CM

## 2024-05-01 DIAGNOSIS — I10 BENIGN ESSENTIAL HTN: ICD-10-CM

## 2024-05-01 DIAGNOSIS — E78.2 MIXED HYPERLIPIDEMIA: ICD-10-CM

## 2024-05-01 PROBLEM — E66.813 CLASS 3 SEVERE OBESITY IN ADULT: Status: RESOLVED | Noted: 2023-11-25 | Resolved: 2024-05-01

## 2024-05-01 PROBLEM — R14.0 FUNCTIONAL BLOATING: Status: RESOLVED | Noted: 2023-04-06 | Resolved: 2024-05-01

## 2024-05-01 LAB
ALBUMIN SERPL BCP-MCNC: 4.5 G/DL (ref 3.4–5)
ALP SERPL-CCNC: 99 U/L (ref 33–110)
ALT SERPL W P-5'-P-CCNC: 17 U/L (ref 7–45)
ANION GAP SERPL CALC-SCNC: 15 MMOL/L (ref 10–20)
AST SERPL W P-5'-P-CCNC: 19 U/L (ref 9–39)
BILIRUB SERPL-MCNC: 0.9 MG/DL (ref 0–1.2)
BUN SERPL-MCNC: 17 MG/DL (ref 6–23)
CALCIUM SERPL-MCNC: 9.8 MG/DL (ref 8.6–10.3)
CHLORIDE SERPL-SCNC: 104 MMOL/L (ref 98–107)
CHOLEST SERPL-MCNC: 145 MG/DL (ref 0–199)
CHOLESTEROL/HDL RATIO: 2.1
CO2 SERPL-SCNC: 26 MMOL/L (ref 21–32)
CREAT SERPL-MCNC: 0.85 MG/DL (ref 0.5–1.05)
EGFRCR SERPLBLD CKD-EPI 2021: 81 ML/MIN/1.73M*2
GLUCOSE SERPL-MCNC: 107 MG/DL (ref 74–99)
HDLC SERPL-MCNC: 67.8 MG/DL
LDLC SERPL CALC-MCNC: 60 MG/DL
NON HDL CHOLESTEROL: 77 MG/DL (ref 0–149)
POTASSIUM SERPL-SCNC: 4.2 MMOL/L (ref 3.5–5.3)
PROT SERPL-MCNC: 7.7 G/DL (ref 6.4–8.2)
SODIUM SERPL-SCNC: 141 MMOL/L (ref 136–145)
TRIGL SERPL-MCNC: 85 MG/DL (ref 0–149)
VLDL: 17 MG/DL (ref 0–40)

## 2024-05-01 PROCEDURE — 1036F TOBACCO NON-USER: CPT | Performed by: FAMILY MEDICINE

## 2024-05-01 PROCEDURE — 3074F SYST BP LT 130 MM HG: CPT | Performed by: FAMILY MEDICINE

## 2024-05-01 PROCEDURE — 80061 LIPID PANEL: CPT

## 2024-05-01 PROCEDURE — 99214 OFFICE O/P EST MOD 30 MIN: CPT | Performed by: FAMILY MEDICINE

## 2024-05-01 PROCEDURE — 80053 COMPREHEN METABOLIC PANEL: CPT

## 2024-05-01 PROCEDURE — 36415 COLL VENOUS BLD VENIPUNCTURE: CPT

## 2024-05-01 PROCEDURE — 3079F DIAST BP 80-89 MM HG: CPT | Performed by: FAMILY MEDICINE

## 2024-05-01 PROCEDURE — 3008F BODY MASS INDEX DOCD: CPT | Performed by: FAMILY MEDICINE

## 2024-05-01 RX ORDER — LISINOPRIL AND HYDROCHLOROTHIAZIDE 12.5; 2 MG/1; MG/1
1 TABLET ORAL DAILY
Qty: 90 TABLET | Refills: 1 | Status: SHIPPED | OUTPATIENT
Start: 2024-05-01 | End: 2024-10-28

## 2024-05-01 ASSESSMENT — PATIENT HEALTH QUESTIONNAIRE - PHQ9
1. LITTLE INTEREST OR PLEASURE IN DOING THINGS: NOT AT ALL
2. FEELING DOWN, DEPRESSED OR HOPELESS: NOT AT ALL
SUM OF ALL RESPONSES TO PHQ9 QUESTIONS 1 AND 2: 0

## 2024-05-01 NOTE — ASSESSMENT & PLAN NOTE
You have interest in starting a GLP-1 medication which ultimately helps with blood sugar control, weight management, and preservation of beta cell function.  In addition, this class of medication also has cardiovascular benefits    There may be an inherent risk of continual weight gain so you need to also incorporate some dietary changes into your daily regimen even if you choose to use the medication that is provided    If you stop the medication, your craving for food may return and you may gain more weight than you will have lost    Start GLP-1 medication provided, low-dose, weekly, instructions provided  Notify office in 4 weeks regarding efficacy and tolerability  As long as you continue to tolerate medication, we will increase the dose  Risks, benefits, and options of treatment(s) were discussed after reviewing all current medication(s) and drug allergy(ies)  I opted for the treatment that we discussed with instructions on the medication use for your underlying medical ailment(s)    GLP-1 (glucagon-like peptide-1) medications, such as GLP-1 receptor agonists, offer several benefits for individuals who take them. Here are some of the potential advantages:    1. Blood sugar control: GLP-1 medications help regulate blood sugar levels by stimulating insulin release from the pancreas and reducing the production of glucagon, a hormone that raises blood sugar. This can lead to improved glycemic control in individuals with hyperglycemia/prediabetes including those with type 2 diabetes.    2. Weight management: GLP-1 medications have been associated with weight loss or weight maintenance. They can promote a feeling of fullness, reduce appetite, and slow down gastric emptying, which may contribute to decreased calorie intake and potential weight loss.    3. Cardiovascular protection: Some GLP-1 medications have demonstrated cardiovascular benefits. They have been shown to reduce the risk of major adverse cardiovascular  events (MACE) in individuals with established cardiovascular disease or high cardiovascular risk.    4. Blood pressure control: GLP-1 medications may have a positive impact on blood pressure. They can lead to modest reductions in systolic and diastolic blood pressure, which is beneficial for individuals with elevated blood pressure, hypertension, or cardiovascular disease.    5. Potential renal benefits: There is evidence suggesting that GLP-1 medications might have renal protective effects. They may reduce albuminuria (presence of protein in urine) and slow the decline of kidney function in individuals, in particular those with diabetic kidney disease.    6. Lower hypoglycemia risk: Compared to some other diabetes medications, GLP-1 medications have a lower risk of causing hypoglycemia (low blood sugar). This can be advantageous, particularly for individuals who are at higher risk of experiencing hypoglycemic episodes.    7. Potential improvement in beta-cell function: GLP-1 medications have been associated with preserving or improving pancreatic beta-cell function, which is responsible for insulin production. This can be beneficial for individuals with prediabetes and type 2 diabetes.

## 2024-05-01 NOTE — ASSESSMENT & PLAN NOTE
Continue to follow with ophthalmology per protocol  Notify me if there is anything that I can do to help
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
## 2024-05-02 DIAGNOSIS — E78.2 MIXED HYPERLIPIDEMIA: ICD-10-CM

## 2024-05-02 RX ORDER — ROSUVASTATIN CALCIUM 10 MG/1
10 TABLET, COATED ORAL DAILY
Qty: 90 TABLET | Refills: 1 | Status: SHIPPED | OUTPATIENT
Start: 2024-05-02 | End: 2024-10-29

## 2024-05-02 NOTE — RESULT ENCOUNTER NOTE
Glucose stable at 107 previous 101  Electrolytes, kidney, liver normal  Cholesterol looks excellent across the board  No changes to medication recommended  All of your medication has been sent to the pharmacy for 6 months

## 2024-05-07 ENCOUNTER — TELEPHONE (OUTPATIENT)
Dept: CARDIOLOGY | Facility: CLINIC | Age: 54
End: 2024-05-07
Payer: COMMERCIAL

## 2024-05-07 ENCOUNTER — APPOINTMENT (OUTPATIENT)
Dept: CARDIOLOGY | Facility: CLINIC | Age: 54
End: 2024-05-07
Payer: COMMERCIAL

## 2024-05-07 DIAGNOSIS — E78.2 MIXED HYPERLIPIDEMIA: ICD-10-CM

## 2024-05-07 DIAGNOSIS — E66.01 MORBID OBESITY WITH BMI OF 45.0-49.9, ADULT (MULTI): ICD-10-CM

## 2024-05-07 DIAGNOSIS — I10 BENIGN ESSENTIAL HTN: Primary | ICD-10-CM

## 2024-05-07 DIAGNOSIS — R07.89 ATYPICAL CHEST PAIN: ICD-10-CM

## 2024-05-07 NOTE — TELEPHONE ENCOUNTER
I spoke with patient today and she said that you wanted her to have a stress test done.   She scheduled herself to see Dr. Osorio but when I called her about the time she said this was a stress test and I explained that it was an office visit.   Please Advise?

## 2024-05-08 NOTE — TELEPHONE ENCOUNTER
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 She complains of fatigue and sore throat. Her throat pain is 6 out of 10 and is constant. She also has an intermittent dry cough.   Has been f Patient is scheduled for June 18th. For Stress test.

## 2024-05-21 ENCOUNTER — TELEPHONE (OUTPATIENT)
Dept: PRIMARY CARE | Facility: CLINIC | Age: 54
End: 2024-05-21
Payer: COMMERCIAL

## 2024-05-21 NOTE — TELEPHONE ENCOUNTER
Pt asking if we can send in the next dose she will need of zepbound in hopes she can fill it easier

## 2024-05-23 DIAGNOSIS — E66.01 MORBID OBESITY WITH BMI OF 45.0-49.9, ADULT (MULTI): Primary | ICD-10-CM

## 2024-06-13 ENCOUNTER — TELEPHONE (OUTPATIENT)
Dept: CARDIOLOGY | Facility: CLINIC | Age: 54
End: 2024-06-13
Payer: COMMERCIAL

## 2024-06-18 ENCOUNTER — HOSPITAL ENCOUNTER (OUTPATIENT)
Dept: CARDIOLOGY | Facility: CLINIC | Age: 54
Discharge: HOME | End: 2024-06-18
Payer: COMMERCIAL

## 2024-06-18 VITALS
HEART RATE: 91 BPM | HEIGHT: 62 IN | SYSTOLIC BLOOD PRESSURE: 113 MMHG | WEIGHT: 249 LBS | BODY MASS INDEX: 45.82 KG/M2 | DIASTOLIC BLOOD PRESSURE: 75 MMHG

## 2024-06-18 DIAGNOSIS — R07.89 ATYPICAL CHEST PAIN: ICD-10-CM

## 2024-06-18 DIAGNOSIS — E66.01 MORBID OBESITY WITH BMI OF 45.0-49.9, ADULT (MULTI): ICD-10-CM

## 2024-06-18 DIAGNOSIS — R07.9 CHEST PAIN, UNSPECIFIED: ICD-10-CM

## 2024-06-18 DIAGNOSIS — I10 BENIGN ESSENTIAL HTN: ICD-10-CM

## 2024-06-18 DIAGNOSIS — E78.2 MIXED HYPERLIPIDEMIA: ICD-10-CM

## 2024-06-18 PROCEDURE — 93350 STRESS TTE ONLY: CPT | Performed by: INTERNAL MEDICINE

## 2024-06-18 PROCEDURE — 93017 CV STRESS TEST TRACING ONLY: CPT

## 2024-06-18 PROCEDURE — 93018 CV STRESS TEST I&R ONLY: CPT | Performed by: INTERNAL MEDICINE

## 2024-06-18 PROCEDURE — 93016 CV STRESS TEST SUPVJ ONLY: CPT | Performed by: INTERNAL MEDICINE

## 2024-09-06 ENCOUNTER — APPOINTMENT (OUTPATIENT)
Dept: PRIMARY CARE | Facility: CLINIC | Age: 54
End: 2024-09-06
Payer: COMMERCIAL

## 2024-09-12 ENCOUNTER — LAB (OUTPATIENT)
Dept: LAB | Facility: LAB | Age: 54
End: 2024-09-12
Payer: COMMERCIAL

## 2024-09-12 ENCOUNTER — APPOINTMENT (OUTPATIENT)
Dept: PRIMARY CARE | Facility: CLINIC | Age: 54
End: 2024-09-12
Payer: COMMERCIAL

## 2024-09-12 VITALS
WEIGHT: 233 LBS | HEART RATE: 66 BPM | SYSTOLIC BLOOD PRESSURE: 122 MMHG | DIASTOLIC BLOOD PRESSURE: 82 MMHG | BODY MASS INDEX: 42.88 KG/M2 | HEIGHT: 62 IN

## 2024-09-12 DIAGNOSIS — I10 BENIGN ESSENTIAL HTN: ICD-10-CM

## 2024-09-12 DIAGNOSIS — E78.2 MIXED HYPERLIPIDEMIA: ICD-10-CM

## 2024-09-12 DIAGNOSIS — Z00.00 ANNUAL PHYSICAL EXAM: Primary | ICD-10-CM

## 2024-09-12 DIAGNOSIS — E66.01 MORBID OBESITY WITH BMI OF 45.0-49.9, ADULT (MULTI): ICD-10-CM

## 2024-09-12 DIAGNOSIS — L82.1 SEBORRHEIC KERATOSIS: ICD-10-CM

## 2024-09-12 DIAGNOSIS — Z12.31 ENCOUNTER FOR SCREENING MAMMOGRAM FOR BREAST CANCER: ICD-10-CM

## 2024-09-12 DIAGNOSIS — Z00.00 ANNUAL PHYSICAL EXAM: ICD-10-CM

## 2024-09-12 LAB
ALBUMIN SERPL BCP-MCNC: 4.6 G/DL (ref 3.4–5)
ALP SERPL-CCNC: 91 U/L (ref 33–110)
ALT SERPL W P-5'-P-CCNC: 18 U/L (ref 7–45)
ANION GAP SERPL CALC-SCNC: 9 MMOL/L (ref 10–20)
AST SERPL W P-5'-P-CCNC: 19 U/L (ref 9–39)
BASOPHILS # BLD AUTO: 0.03 X10*3/UL (ref 0–0.1)
BASOPHILS NFR BLD AUTO: 0.5 %
BILIRUB SERPL-MCNC: 1 MG/DL (ref 0–1.2)
BUN SERPL-MCNC: 15 MG/DL (ref 6–23)
CALCIUM SERPL-MCNC: 9.6 MG/DL (ref 8.6–10.3)
CHLORIDE SERPL-SCNC: 101 MMOL/L (ref 98–107)
CHOLEST SERPL-MCNC: 143 MG/DL (ref 0–199)
CHOLESTEROL/HDL RATIO: 2.3
CO2 SERPL-SCNC: 30 MMOL/L (ref 21–32)
CREAT SERPL-MCNC: 0.92 MG/DL (ref 0.5–1.05)
EGFRCR SERPLBLD CKD-EPI 2021: 73 ML/MIN/1.73M*2
EOSINOPHIL # BLD AUTO: 0.1 X10*3/UL (ref 0–0.7)
EOSINOPHIL NFR BLD AUTO: 1.7 %
ERYTHROCYTE [DISTWIDTH] IN BLOOD BY AUTOMATED COUNT: 12.8 % (ref 11.5–14.5)
GLUCOSE SERPL-MCNC: 94 MG/DL (ref 74–99)
HCT VFR BLD AUTO: 42 % (ref 36–46)
HDLC SERPL-MCNC: 62 MG/DL
HGB BLD-MCNC: 14.2 G/DL (ref 12–16)
IMM GRANULOCYTES # BLD AUTO: 0.02 X10*3/UL (ref 0–0.7)
IMM GRANULOCYTES NFR BLD AUTO: 0.3 % (ref 0–0.9)
LDLC SERPL CALC-MCNC: 59 MG/DL
LYMPHOCYTES # BLD AUTO: 1.77 X10*3/UL (ref 1.2–4.8)
LYMPHOCYTES NFR BLD AUTO: 30.3 %
MCH RBC QN AUTO: 29.2 PG (ref 26–34)
MCHC RBC AUTO-ENTMCNC: 33.8 G/DL (ref 32–36)
MCV RBC AUTO: 86 FL (ref 80–100)
MONOCYTES # BLD AUTO: 0.48 X10*3/UL (ref 0.1–1)
MONOCYTES NFR BLD AUTO: 8.2 %
NEUTROPHILS # BLD AUTO: 3.45 X10*3/UL (ref 1.2–7.7)
NEUTROPHILS NFR BLD AUTO: 59 %
NON HDL CHOLESTEROL: 81 MG/DL (ref 0–149)
NRBC BLD-RTO: 0 /100 WBCS (ref 0–0)
PLATELET # BLD AUTO: 221 X10*3/UL (ref 150–450)
POC APPEARANCE, URINE: CLEAR
POC BILIRUBIN, URINE: NEGATIVE
POC BLOOD, URINE: NEGATIVE
POC COLOR, URINE: YELLOW
POC GLUCOSE, URINE: NEGATIVE MG/DL
POC KETONES, URINE: NEGATIVE MG/DL
POC LEUKOCYTES, URINE: NEGATIVE
POC NITRITE,URINE: NEGATIVE
POC PH, URINE: 6 PH
POC PROTEIN, URINE: NEGATIVE MG/DL
POC SPECIFIC GRAVITY, URINE: 1.02
POC UROBILINOGEN, URINE: 0.2 EU/DL
POTASSIUM SERPL-SCNC: 3.9 MMOL/L (ref 3.5–5.3)
PROT SERPL-MCNC: 7.6 G/DL (ref 6.4–8.2)
RBC # BLD AUTO: 4.86 X10*6/UL (ref 4–5.2)
SODIUM SERPL-SCNC: 136 MMOL/L (ref 136–145)
TRIGL SERPL-MCNC: 110 MG/DL (ref 0–149)
TSH SERPL-ACNC: 2.88 MIU/L (ref 0.44–3.98)
VLDL: 22 MG/DL (ref 0–40)
WBC # BLD AUTO: 5.9 X10*3/UL (ref 4.4–11.3)

## 2024-09-12 PROCEDURE — 1036F TOBACCO NON-USER: CPT | Performed by: FAMILY MEDICINE

## 2024-09-12 PROCEDURE — 36415 COLL VENOUS BLD VENIPUNCTURE: CPT

## 2024-09-12 PROCEDURE — 84443 ASSAY THYROID STIM HORMONE: CPT

## 2024-09-12 PROCEDURE — 81002 URINALYSIS NONAUTO W/O SCOPE: CPT | Performed by: FAMILY MEDICINE

## 2024-09-12 PROCEDURE — 3074F SYST BP LT 130 MM HG: CPT | Performed by: FAMILY MEDICINE

## 2024-09-12 PROCEDURE — 99214 OFFICE O/P EST MOD 30 MIN: CPT | Performed by: FAMILY MEDICINE

## 2024-09-12 PROCEDURE — 85025 COMPLETE CBC W/AUTO DIFF WBC: CPT

## 2024-09-12 PROCEDURE — 80053 COMPREHEN METABOLIC PANEL: CPT

## 2024-09-12 PROCEDURE — 99396 PREV VISIT EST AGE 40-64: CPT | Performed by: FAMILY MEDICINE

## 2024-09-12 PROCEDURE — 3079F DIAST BP 80-89 MM HG: CPT | Performed by: FAMILY MEDICINE

## 2024-09-12 PROCEDURE — 80061 LIPID PANEL: CPT

## 2024-09-12 PROCEDURE — 3008F BODY MASS INDEX DOCD: CPT | Performed by: FAMILY MEDICINE

## 2024-09-12 RX ORDER — LISINOPRIL AND HYDROCHLOROTHIAZIDE 12.5; 2 MG/1; MG/1
1 TABLET ORAL DAILY
Qty: 90 TABLET | Refills: 1 | Status: SHIPPED | OUTPATIENT
Start: 2024-09-12 | End: 2025-03-11

## 2024-09-12 ASSESSMENT — PROMIS GLOBAL HEALTH SCALE
RATE_PHYSICAL_HEALTH: GOOD
CARRYOUT_PHYSICAL_ACTIVITIES: COMPLETELY
RATE_SOCIAL_SATISFACTION: VERY GOOD
RATE_GENERAL_HEALTH: VERY GOOD
RATE_QUALITY_OF_LIFE: VERY GOOD
EMOTIONAL_PROBLEMS: NEVER
RATE_AVERAGE_PAIN: 0
CARRYOUT_SOCIAL_ACTIVITIES: EXCELLENT
RATE_AVERAGE_FATIGUE: MILD
RATE_MENTAL_HEALTH: EXCELLENT

## 2024-09-12 ASSESSMENT — PATIENT HEALTH QUESTIONNAIRE - PHQ9
SUM OF ALL RESPONSES TO PHQ9 QUESTIONS 1 AND 2: 0
2. FEELING DOWN, DEPRESSED OR HOPELESS: NOT AT ALL
1. LITTLE INTEREST OR PLEASURE IN DOING THINGS: NOT AT ALL

## 2024-09-12 NOTE — PROGRESS NOTES
Subjective   Patient ID: Indira Solomon is a 54 y.o. female who presents for Annual Exam.    Past Medical, Surgical, and Family History reviewed and updated in chart.    Reviewed all medications by prescribing practitioner or clinical pharmacist (such as prescriptions, OTCs, herbal therapies and supplements) and documented in the medical record.    HPI  1. Physical exam.  Pap: Last done in March 2022, results were normal with negative HPV; next due in 2027.  Mammogram: Last performed in October 2023.  Colonoscopy: Completed in January 2023, with a 5-year clearance.  Immunizations are up-to-date.  Patient is feeling well today without any significant medical concerns or complaints.    2. Hyperlipidemia.  Currently taking rosuvastatin 5 mg as prescribed.  Tolerating the medication well, and requesting a refill.    3. Hypertension.  Normotensive on intake, no chest pain or shortness of breath. Currently taking the medication noted on the chart.  Tolerating the medication well, and requesting a refill.    4. Mole on arm, proximal and lateral.  The patient reports a mole on her upper arm that her  wants her to have checked.  She is not overly concerned about it but does acknowledge frequent sun exposure.    5. Elevated BMI.  Patient is losing about 1 pound per week while taking Zepbound, currently at 5 mg weekly. However, the medication is cost prohibitive at $550 per month. She has tolerated it well and is inquiring about any potential ways to obtain the medication at a lower cost.    Denies any adverse effects from the medication and would like to continue it, as she feels it is beneficial and anticipates continued benefit in the future.    Review of Systems  All pertinent positive symptoms are included in the history of present illness.    All other systems have been reviewed and are negative and noncontributory to this patient's current ailments.    Past Medical History:   Diagnosis Date    Hyperlipidemia      Hypertension     Laceration without foreign body, right foot, initial encounter 10/14/2015    Laceration of foot, right    Other microscopic hematuria 2018    Microscopic hematuria    Primary osteoarthritis of right knee 2023    Tinnitus, bilateral 2019    Tinnitus of both ears    Tubular adenoma 2020     Past Surgical History:   Procedure Laterality Date    APPENDECTOMY       SECTION, CLASSIC  10/07/2013     Section    CHOLECYSTECTOMY  2017    COLONOSCOPY  2020    HYSTEROSCOPY  10/07/2013    Hysteroscopy With Endometrial Ablation    TUBAL LIGATION Bilateral 2020     Social History     Tobacco Use    Smoking status: Former     Types: Cigarettes     Passive exposure: Past    Smokeless tobacco: Never   Substance Use Topics    Alcohol use: Yes     Comment: rare occasion    Drug use: Never     Family History   Problem Relation Name Age of Onset    Diabetes Father      Hypertension Father          Essential    Asthma Son      Cancer Mother's Brother       Immunization History   Administered Date(s) Administered    Flu vaccine (IIV4), preservative free *Check age/dose* 10/14/2015, 2018, 2019, 10/16/2023    Flu vaccine, quadrivalent, no egg protein, age 6 month or greater (FLUCELVAX) 10/26/2021    Influenza, seasonal, injectable 2022    Pfizer Purple Cap SARS-CoV-2 2021, 2021, 2021    Tdap vaccine, age 7 year and older (BOOSTRIX, ADACEL) 10/07/2013, 09/10/2021    Zoster vaccine, recombinant, adult (SHINGRIX) 10/26/2021, 2022     Current Outpatient Medications   Medication Instructions    lisinopriL-hydrochlorothiazide 20-12.5 mg tablet 1 tablet, oral, Daily    rosuvastatin (CRESTOR) 10 mg, oral, Daily    saccharomyces boulardii (FLORASTOR) 250 mg, oral, 2 times daily    tirzepatide (weight loss) (ZEPBOUND) 5 mg, subcutaneous, Every 7 days     No Known Allergies    Objective   Vitals:    24 1028   BP: 122/82   Pulse: 66  "  Weight: 106 kg (233 lb)   Height: 1.575 m (5' 2\")     Body mass index is 42.62 kg/m².    BP Readings from Last 3 Encounters:   09/12/24 122/82   06/18/24 113/75   05/01/24 120/80      Wt Readings from Last 3 Encounters:   09/12/24 106 kg (233 lb)   06/18/24 113 kg (249 lb)   05/01/24 113 kg (249 lb)        Office Visit on 09/12/2024   Component Date Value    POC Color, Urine 09/12/2024 Yellow     POC Appearance, Urine 09/12/2024 Clear     POC Glucose, Urine 09/12/2024 NEGATIVE     POC Bilirubin, Urine 09/12/2024 NEGATIVE     POC Ketones, Urine 09/12/2024 NEGATIVE     POC Specific Gravity, Ur* 09/12/2024 1.020     POC Blood, Urine 09/12/2024 NEGATIVE     POC PH, Urine 09/12/2024 6.0     POC Protein, Urine 09/12/2024 NEGATIVE     POC Urobilinogen, Urine 09/12/2024 0.2     Poc Nitrite, Urine 09/12/2024 NEGATIVE     POC Leukocytes, Urine 09/12/2024 NEGATIVE      Physical Exam  CONSTITUTIONAL - well nourished, well developed, looks like stated age, in no acute distress, not ill-appearing, and not tired appearing  SKIN - normal skin color and pigmentation, normal skin turgor without rash, lesions, or nodules visualized; right upper extremity, small, circular, slightly raised superficially flaky light brown lesion  HEAD - no trauma, normocephalic  EYES - extraocular muscles are intact, and normal external exam  NECK - supple without rigidity, no neck mass was observed  CHEST - clear to auscultation, no wheezing, no crackles and no rales, good effort  CARDIAC - regular rate and regular rhythm, no skipped beats, no murmur  ABDOMEN - no organomegaly, soft, nontender, nondistended, normal bowel sound   EXTREMITIES - no obvious or evident edema, no obvious or evident deformities  NEUROLOGICAL - alert, oriented and no focal signs  PSYCHIATRIC - alert, pleasant and cordial, age-appropriate  IMMUNOLOGIC - no cervical lymphadenopathy    Assessment/Plan   Problem List Items Addressed This Visit       Benign essential HTN     " Stable, no changes to medication recommended         Relevant Medications    lisinopriL-hydrochlorothiazide 20-12.5 mg tablet    Mixed hyperlipidemia     Obtain fasting blood work after which I can adjust medications accordingly or continue with current dose         Annual physical exam - Primary     Complete history and physical examination was performed  Colonoscopy UTD, mammogram due, Pap UTD  We will notify of test results once available  Continue working on lifestyle modification which includes goal of weight loss         Relevant Orders    Lipid Panel    TSH with reflex to Free T4 if abnormal    Comprehensive Metabolic Panel    CBC and Auto Differential    POCT UA (nonautomated) manually resulted (Completed)    Morbid obesity with BMI of 45.0-49.9, adult (Multi)     Had a discussion at length regarding the cost of Zepbound. Currently, you're paying around $550 for the medication. I will refer you to a clinical pharmacist who can help you obtain Zepbound at a reduced cost. You are in agreement with this plan. Referral placed in the system.         Relevant Orders    Referral to Clinical Pharmacy    Seborrheic keratosis     The lesion on your arm is likely a seborrheic keratosis. Seborrheic keratosis is a common, benign skin condition characterized by the growth of non-cancerous, wart-like lesions on the skin. These growths are usually small, ranging in color from light brown to black, and have a rough, scaly texture. They often appear on the face, chest, shoulders, or back. Seborrheic keratosis is not harmful and typically does not require treatment. You do not want it removed at this time, but please let us know if it grows or becomes bothersome and we can remove it.          Other Visit Diagnoses       Encounter for screening mammogram for breast cancer        Relevant Orders    BI mammo bilateral screening tomosynthesis

## 2024-09-12 NOTE — ASSESSMENT & PLAN NOTE
Had a discussion at length regarding the cost of Zepbound. Currently, you're paying around $550 for the medication. I will refer you to a clinical pharmacist who can help you obtain Zepbound at a reduced cost. You are in agreement with this plan. Referral placed in the system.

## 2024-09-12 NOTE — ASSESSMENT & PLAN NOTE
The lesion on your arm is likely a seborrheic keratosis. Seborrheic keratosis is a common, benign skin condition characterized by the growth of non-cancerous, wart-like lesions on the skin. These growths are usually small, ranging in color from light brown to black, and have a rough, scaly texture. They often appear on the face, chest, shoulders, or back. Seborrheic keratosis is not harmful and typically does not require treatment. You do not want it removed at this time, but please let us know if it grows or becomes bothersome and we can remove it.

## 2024-09-12 NOTE — ASSESSMENT & PLAN NOTE
Obtain fasting blood work after which I can adjust medications accordingly or continue with current dose

## 2024-09-12 NOTE — ASSESSMENT & PLAN NOTE
Complete history and physical examination was performed  Colonoscopy UTD, mammogram due, Pap UTD  We will notify of test results once available  Continue working on lifestyle modification which includes goal of weight loss

## 2024-09-15 DIAGNOSIS — E78.2 MIXED HYPERLIPIDEMIA: ICD-10-CM

## 2024-09-15 RX ORDER — ROSUVASTATIN CALCIUM 10 MG/1
10 TABLET, COATED ORAL DAILY
Qty: 90 TABLET | Refills: 1 | Status: SHIPPED | OUTPATIENT
Start: 2024-09-15 | End: 2025-03-14

## 2024-09-15 NOTE — RESULT ENCOUNTER NOTE
Sugar, kidney, liver, electrolytes, thyroid, cholesterol look excellent across the board  CBC is normal as well, no further indication of anemia  Congratulations, keep up the great work  At this juncture, I am not going to make any changes in your medication.  I sent over rosuvastatin to the [facility] for 6 months, until March 11, 2025

## 2024-10-16 NOTE — PROGRESS NOTES
Clinical Pharmacy Appointment    Patient ID: Indira Solomon is a 54 y.o. female who presents for Obesity.    Pt is here for First appointment.     Referring Provider: Luis Kulkarni DO  PCP: Luis Kulkarni DO   Last visit with PCP: 9/12/24   Next visit with PCP: not yet scheduled     Subjective   HPI  WEIGHT LOSS  PMH significant for HTN, HLD, obesity.  Special needs/barriers to therapy: None identified    BMI Readings from Last 1 Encounters:   09/12/24 42.62 kg/m²   Starting weight: 233 lbs  Weight loss: down 25 lbs    Lab Results   Component Value Date    GLUCOSE 94 09/12/2024   Hyperglycemia: Denies signs and symptoms    Lifestyle  Diet: eating healthier   Has worked with nutritionist/dietician? No    Physical Activity: increasing exercise     Other Potential Contributing Factors  Medications that may cause weight gain: none  Mental health: No current concerns  Eating Disorders: Denies Hx of any eating disorder  Comorbidities: dyslipidemias and hypertension    Non-Pharmacological Therapy  Weight loss techniques attempted:  Medications     Pharmacological Therapy  Current Medications: Zepbound 5 mg   Previous Medications: n/a     Adherence: Takes medication as directed and reports no missed doses  Adverse Effects: nausea     Insurance coverage of weight-loss medications? No, plan exclusion  Eligible for copay cards/programs? Yes, commercial insurance       Preventative Care  ASCVD Risk:   The 10-year ASCVD risk score (Wilton DK, et al., 2019) is: 1.7%    Values used to calculate the score:      Age: 54 years      Sex: Female      Is Non- : No      Diabetic: No      Tobacco smoker: No      Systolic Blood Pressure: 122 mmHg      Is BP treated: Yes      HDL Cholesterol: 62 mg/dL      Total Cholesterol: 143 mg/dL  On Statin?: Yes, rosuvastatin 5 mg    Immunizations Needed: All up-to-date and documented  Tobacco Use:  former smoker    Drug Interactions  No relevant drug interactions were  "noted.    Objective   No Known Allergies  Social History     Social History Narrative    Not on file      Medication Review  Current Outpatient Medications   Medication Instructions    lisinopriL-hydrochlorothiazide 20-12.5 mg tablet 1 tablet, oral, Daily    rosuvastatin (CRESTOR) 10 mg, oral, Daily    saccharomyces boulardii (FLORASTOR) 250 mg, oral, 2 times daily    Zepbound 7.5 mg, subcutaneous, Every 7 days      Vitals  BP Readings from Last 2 Encounters:   09/12/24 122/82   06/18/24 113/75     BMI Readings from Last 1 Encounters:   09/12/24 42.62 kg/m²      Labs  A1C  No results found for: \"HGBA1C\"  BMP  Lab Results   Component Value Date    CALCIUM 9.6 09/12/2024     09/12/2024    K 3.9 09/12/2024    CO2 30 09/12/2024     09/12/2024    BUN 15 09/12/2024    CREATININE 0.92 09/12/2024    EGFR 73 09/12/2024     LFTs  Lab Results   Component Value Date    ALT 18 09/12/2024    AST 19 09/12/2024    ALKPHOS 91 09/12/2024    BILITOT 1.0 09/12/2024     FLP  Lab Results   Component Value Date    TRIG 110 09/12/2024    CHOL 143 09/12/2024    LDLF 74 04/03/2023    LDLCALC 59 09/12/2024    HDL 62.0 09/12/2024     Urine Microalbumin  No results found for: \"MICROALBCREA\"  Weight Management  Wt Readings from Last 3 Encounters:   09/12/24 106 kg (233 lb)   06/18/24 113 kg (249 lb)   05/01/24 113 kg (249 lb)      There is no height or weight on file to calculate BMI.     Assessment/Plan   Problem List Items Addressed This Visit       Morbid obesity with BMI of 45.0-49.9, adult (Multi)    Relevant Medications    tirzepatide, weight loss, (Zepbound) 7.5 mg/0.5 mL injection    Other Relevant Orders    Referral to Clinical Pharmacy     Patient has been taking Zepbound since 5/2024 and has lost 25 lbs so far. Her weight loss has plateaued on the 5 mg and is ready for an increase in dose.     Medication Changes:  INCREASE  Zepbound 5 mg to 7.5 mg weekly    Future Considerations:  Titrate Zepbound as tolerated to weight " loss goals     Monitoring and Education:  Counseled patient on relevant MOA, expectations, side effects, duration of therapy, contraindications, administration, and monitoring parameters.    Clinical Pharmacist follow-up: 4 weeks, Telehealth visit    Continue all meds under the continuation of care with the referring provider and clinical pharmacy team.    Thank you,  Claribel Fam, PharmD  Clinical Pharmacist  [phone]    Verbal consent to manage patient's drug therapy was obtained from the patient. They were informed they may decline to participate or withdraw from participation in pharmacy services at any time.

## 2024-10-18 ENCOUNTER — PHARMACY VISIT (OUTPATIENT)
Dept: PHARMACY | Facility: CLINIC | Age: 54
End: 2024-10-18
Payer: COMMERCIAL

## 2024-10-18 ENCOUNTER — APPOINTMENT (OUTPATIENT)
Dept: PHARMACY | Facility: HOSPITAL | Age: 54
End: 2024-10-18
Payer: COMMERCIAL

## 2024-10-18 DIAGNOSIS — E66.01 MORBID OBESITY WITH BMI OF 45.0-49.9, ADULT (MULTI): ICD-10-CM

## 2024-10-18 PROCEDURE — RXMED WILLOW AMBULATORY MEDICATION CHARGE

## 2024-10-18 RX ORDER — TIRZEPATIDE 7.5 MG/.5ML
7.5 INJECTION, SOLUTION SUBCUTANEOUS
Qty: 2 ML | Refills: 3 | Status: SHIPPED | OUTPATIENT
Start: 2024-10-18

## 2024-11-08 ENCOUNTER — HOSPITAL ENCOUNTER (OUTPATIENT)
Dept: RADIOLOGY | Facility: CLINIC | Age: 54
Discharge: HOME | End: 2024-11-08
Payer: COMMERCIAL

## 2024-11-08 VITALS — WEIGHT: 233 LBS | HEIGHT: 62 IN | BODY MASS INDEX: 42.88 KG/M2

## 2024-11-08 DIAGNOSIS — Z12.31 ENCOUNTER FOR SCREENING MAMMOGRAM FOR BREAST CANCER: ICD-10-CM

## 2024-11-08 PROCEDURE — 77067 SCR MAMMO BI INCL CAD: CPT

## 2024-11-15 ENCOUNTER — APPOINTMENT (OUTPATIENT)
Dept: PHARMACY | Facility: HOSPITAL | Age: 54
End: 2024-11-15
Payer: COMMERCIAL

## 2024-11-15 ENCOUNTER — PHARMACY VISIT (OUTPATIENT)
Dept: PHARMACY | Facility: CLINIC | Age: 54
End: 2024-11-15
Payer: COMMERCIAL

## 2024-11-15 DIAGNOSIS — E66.01 MORBID OBESITY WITH BMI OF 45.0-49.9, ADULT (MULTI): ICD-10-CM

## 2024-11-15 PROCEDURE — RXMED WILLOW AMBULATORY MEDICATION CHARGE

## 2024-11-15 NOTE — PROGRESS NOTES
Clinical Pharmacy Appointment    Patient ID: Indira Solomon is a 54 y.o. female who presents for Obesity.    Pt is here for Follow Up appointment.     Referring Provider: Luis Kulkarni DO  PCP: Luis Kulkarni DO   Last visit with PCP: 9/12/24   Next visit with PCP: not yet scheduled     Subjective   HPI  WEIGHT LOSS  PMH significant for HTN, HLD, obesity.  Special needs/barriers to therapy: None identified    BMI Readings from Last 1 Encounters:   11/08/24 42.62 kg/m²   Starting weight: 233 lbs  Weight loss: down 26 lbs    Lab Results   Component Value Date    GLUCOSE 94 09/12/2024   Hyperglycemia: Denies signs and symptoms    Lifestyle  Diet: eating healthier   Has worked with nutritionist/dietician? No    Physical Activity: increasing exercise     Other Potential Contributing Factors  Medications that may cause weight gain: none  Mental health: No current concerns  Eating Disorders: Denies Hx of any eating disorder  Comorbidities: dyslipidemias and hypertension    Non-Pharmacological Therapy  Weight loss techniques attempted:  Medications     Pharmacological Therapy  Current Medications: Zepbound 7.5 mg   Previous Medications: n/a     Adherence: Takes medication as directed and reports no missed doses  Adverse Effects: nausea     Insurance coverage of weight-loss medications? No, plan exclusion  Eligible for copay cards/programs? Yes, commercial insurance       Preventative Care  ASCVD Risk:   The 10-year ASCVD risk score (Wilton DK, et al., 2019) is: 1.7%    Values used to calculate the score:      Age: 54 years      Sex: Female      Is Non- : No      Diabetic: No      Tobacco smoker: No      Systolic Blood Pressure: 122 mmHg      Is BP treated: Yes      HDL Cholesterol: 62 mg/dL      Total Cholesterol: 143 mg/dL  On Statin?: Yes, rosuvastatin 5 mg    Immunizations Needed: All up-to-date and documented  Tobacco Use:  former smoker    Drug Interactions  No relevant drug interactions were  "noted.    Objective   No Known Allergies  Social History     Social History Narrative    Not on file      Medication Review  Current Outpatient Medications   Medication Instructions    lisinopriL-hydrochlorothiazide 20-12.5 mg tablet 1 tablet, oral, Daily    rosuvastatin (CRESTOR) 10 mg, oral, Daily    saccharomyces boulardii (FLORASTOR) 250 mg, oral, 2 times daily    Zepbound 7.5 mg, subcutaneous, Every 7 days      Vitals  BP Readings from Last 2 Encounters:   09/12/24 122/82   06/18/24 113/75     BMI Readings from Last 1 Encounters:   11/08/24 42.62 kg/m²      Labs  A1C  No results found for: \"HGBA1C\"  BMP  Lab Results   Component Value Date    CALCIUM 9.6 09/12/2024     09/12/2024    K 3.9 09/12/2024    CO2 30 09/12/2024     09/12/2024    BUN 15 09/12/2024    CREATININE 0.92 09/12/2024    EGFR 73 09/12/2024     LFTs  Lab Results   Component Value Date    ALT 18 09/12/2024    AST 19 09/12/2024    ALKPHOS 91 09/12/2024    BILITOT 1.0 09/12/2024     FLP  Lab Results   Component Value Date    TRIG 110 09/12/2024    CHOL 143 09/12/2024    LDLF 74 04/03/2023    LDLCALC 59 09/12/2024    HDL 62.0 09/12/2024     Urine Microalbumin  No results found for: \"MICROALBCREA\"  Weight Management  Wt Readings from Last 3 Encounters:   11/08/24 106 kg (233 lb)   09/12/24 106 kg (233 lb)   06/18/24 113 kg (249 lb)      There is no height or weight on file to calculate BMI.     Assessment/Plan   Problem List Items Addressed This Visit       Morbid obesity with BMI of 45.0-49.9, adult (Multi)    Relevant Orders    Referral to Clinical Pharmacy       Patient has been taking Zepbound since 5/2024 and has lost 26 lbs so far. She has lost a few pounds since starting 7.5 mg two weeks ago. Tolerating well, no side effects. Will continue with 7.5 mg for now and follow up in 4 weeks to evaluate tolerability and efficacy.    Medication Changes:  Continue  Zepbound 7.5 mg weekly    Future Considerations:  Titrate Zepbound as " tolerated to weight loss goals     Monitoring and Education:  Counseled patient on relevant MOA, expectations, side effects, duration of therapy, contraindications, administration, and monitoring parameters.    Clinical Pharmacist follow-up: 4 weeks, Telehealth visit    Continue all meds under the continuation of care with the referring provider and clinical pharmacy team.    Thank you,  Claribel Fam, PharmD  Clinical Pharmacist  [phone]    Verbal consent to manage patient's drug therapy was obtained from the patient. They were informed they may decline to participate or withdraw from participation in pharmacy services at any time.

## 2024-12-06 PROCEDURE — RXMED WILLOW AMBULATORY MEDICATION CHARGE

## 2024-12-10 ENCOUNTER — PHARMACY VISIT (OUTPATIENT)
Dept: PHARMACY | Facility: CLINIC | Age: 54
End: 2024-12-10
Payer: COMMERCIAL

## 2024-12-19 NOTE — PROGRESS NOTES
Clinical Pharmacy Appointment    Patient ID: Indira Solomon is a 54 y.o. female who presents for Obesity.    Pt is here for Follow Up appointment.     Referring Provider: Luis Kulkarni DO  PCP: Luis Kulkarni DO   Last visit with PCP: 9/12/24   Next visit with PCP: not yet scheduled     Subjective   HPI  WEIGHT LOSS  PMH significant for HTN, HLD, obesity.  Special needs/barriers to therapy: None identified    BMI Readings from Last 1 Encounters:   11/08/24 42.62 kg/m²   Starting weight: 250 lbs  Current weight: 220 lbs 12/20/2024  Weight loss: down 30 lbs    Lab Results   Component Value Date    GLUCOSE 94 09/12/2024   Hyperglycemia: Denies signs and symptoms    Lifestyle  Diet: eating healthier   Has worked with nutritionist/dietician? No    Physical Activity: increasing exercise     Other Potential Contributing Factors  Medications that may cause weight gain: none  Mental health: No current concerns  Eating Disorders: Denies Hx of any eating disorder  Comorbidities: dyslipidemias and hypertension    Non-Pharmacological Therapy  Weight loss techniques attempted:  Medications     Pharmacological Therapy  Current Medications: Zepbound 7.5 mg   Previous Medications: n/a     Adherence: Takes medication as directed and reports no missed doses  Adverse Effects: nausea     Insurance coverage of weight-loss medications? No, plan exclusion  Eligible for copay cards/programs? Yes, commercial insurance       Preventative Care  ASCVD Risk:   The 10-year ASCVD risk score (Wilton HAYWOOD, et al., 2019) is: 1.7%    Values used to calculate the score:      Age: 54 years      Sex: Female      Is Non- : No      Diabetic: No      Tobacco smoker: No      Systolic Blood Pressure: 122 mmHg      Is BP treated: Yes      HDL Cholesterol: 62 mg/dL      Total Cholesterol: 143 mg/dL  On Statin?: Yes, rosuvastatin 5 mg    Immunizations Needed: All up-to-date and documented  Tobacco Use:  former smoker    Drug  "Interactions  No relevant drug interactions were noted.    Objective   No Known Allergies  Social History     Social History Narrative    Not on file      Medication Review  Current Outpatient Medications   Medication Instructions    lisinopriL-hydrochlorothiazide 20-12.5 mg tablet 1 tablet, oral, Daily    rosuvastatin (CRESTOR) 10 mg, oral, Daily    saccharomyces boulardii (FLORASTOR) 250 mg, oral, 2 times daily    Zepbound 7.5 mg, subcutaneous, Every 7 days      Vitals  BP Readings from Last 2 Encounters:   09/12/24 122/82   06/18/24 113/75     BMI Readings from Last 1 Encounters:   11/08/24 42.62 kg/m²      Labs  A1C  No results found for: \"HGBA1C\"  BMP  Lab Results   Component Value Date    CALCIUM 9.6 09/12/2024     09/12/2024    K 3.9 09/12/2024    CO2 30 09/12/2024     09/12/2024    BUN 15 09/12/2024    CREATININE 0.92 09/12/2024    EGFR 73 09/12/2024     LFTs  Lab Results   Component Value Date    ALT 18 09/12/2024    AST 19 09/12/2024    ALKPHOS 91 09/12/2024    BILITOT 1.0 09/12/2024     FLP  Lab Results   Component Value Date    TRIG 110 09/12/2024    CHOL 143 09/12/2024    LDLF 74 04/03/2023    LDLCALC 59 09/12/2024    HDL 62.0 09/12/2024     Urine Microalbumin  No results found for: \"MICROALBCREA\"  Weight Management  Wt Readings from Last 3 Encounters:   11/08/24 106 kg (233 lb)   09/12/24 106 kg (233 lb)   06/18/24 113 kg (249 lb)      There is no height or weight on file to calculate BMI.     Assessment/Plan   Problem List Items Addressed This Visit       Morbid obesity with BMI of 45.0-49.9, adult (Multi)    Relevant Orders    Referral to Clinical Pharmacy     Patient has been taking Zepbound since 5/2024 and has lost 30 lbs so far. She is maintaining weight loss at about 1 lb per week with the 7.5 mg dose. Tolerating well, no side effects. Will continue with 7.5 mg for now and follow up in 4 weeks to evaluate tolerability and efficacy.    Medication Changes:  Continue  Zepbound 7.5 mg " weekly    Future Considerations:  Titrate Zepbound as tolerated to weight loss goals     Monitoring and Education:  Counseled patient on relevant MOA, expectations, side effects, duration of therapy, contraindications, administration, and monitoring parameters.    Clinical Pharmacist follow-up: 4 weeks, Telehealth visit    Continue all meds under the continuation of care with the referring provider and clinical pharmacy team.    Thank you,  Claribel Fam, PharmD  Clinical Pharmacist  [phone]    Verbal consent to manage patient's drug therapy was obtained from the patient. They were informed they may decline to participate or withdraw from participation in pharmacy services at any time.

## 2024-12-20 ENCOUNTER — APPOINTMENT (OUTPATIENT)
Dept: PHARMACY | Facility: HOSPITAL | Age: 54
End: 2024-12-20
Payer: COMMERCIAL

## 2024-12-20 DIAGNOSIS — E66.01 MORBID OBESITY WITH BMI OF 45.0-49.9, ADULT (MULTI): ICD-10-CM

## 2025-01-03 PROCEDURE — RXMED WILLOW AMBULATORY MEDICATION CHARGE

## 2025-01-07 ENCOUNTER — PHARMACY VISIT (OUTPATIENT)
Dept: PHARMACY | Facility: CLINIC | Age: 55
End: 2025-01-07
Payer: COMMERCIAL

## 2025-01-14 NOTE — PROGRESS NOTES
Clinical Pharmacy Appointment    Patient ID: Indira Solomon is a 54 y.o. female who presents for Obesity.    Pt is here for Follow Up appointment.     Referring Provider: Luis Kulkarni DO  PCP: Luis Kulkarni DO   Last visit with PCP: 9/12/24   Next visit with PCP: not yet scheduled     Subjective   HPI  WEIGHT LOSS  PMH significant for HTN, HLD, obesity.  Special needs/barriers to therapy: None identified    BMI Readings from Last 1 Encounters:   11/08/24 42.62 kg/m²   Starting weight: 250 lbs  Current weight: 219 lbs 1/17/2025  Weight loss: down 31 lbs      Lab Results   Component Value Date    GLUCOSE 94 09/12/2024   Hyperglycemia: Denies signs and symptoms    Lifestyle  Diet: eating healthier   Has worked with nutritionist/dietician? No    Physical Activity: increasing exercise     Other Potential Contributing Factors  Medications that may cause weight gain: none  Mental health: No current concerns  Eating Disorders: Denies Hx of any eating disorder  Comorbidities: dyslipidemias and hypertension    Non-Pharmacological Therapy  Weight loss techniques attempted:  Medications     Pharmacological Therapy  Current Medications: Zepbound 7.5 mg   Previous Medications: n/a     Adherence: Takes medication as directed and reports no missed doses  Adverse Effects: nausea     Insurance coverage of weight-loss medications? No, plan exclusion  Eligible for copay cards/programs? Yes, commercial insurance       Preventative Care  ASCVD Risk:   The 10-year ASCVD risk score (Wilton HAYWOOD, et al., 2019) is: 1.7%    Values used to calculate the score:      Age: 54 years      Sex: Female      Is Non- : No      Diabetic: No      Tobacco smoker: No      Systolic Blood Pressure: 122 mmHg      Is BP treated: Yes      HDL Cholesterol: 62 mg/dL      Total Cholesterol: 143 mg/dL  On Statin?: Yes, rosuvastatin 5 mg    Immunizations Needed: All up-to-date and documented  Tobacco Use:  former smoker    Drug  "Interactions  No relevant drug interactions were noted.    Objective   No Known Allergies  Social History     Social History Narrative    Not on file      Medication Review  Current Outpatient Medications   Medication Instructions    lisinopriL-hydrochlorothiazide 20-12.5 mg tablet 1 tablet, oral, Daily    rosuvastatin (CRESTOR) 10 mg, oral, Daily    saccharomyces boulardii (FLORASTOR) 250 mg, oral, 2 times daily    Zepbound 7.5 mg, subcutaneous, Every 7 days      Vitals  BP Readings from Last 2 Encounters:   09/12/24 122/82   06/18/24 113/75     BMI Readings from Last 1 Encounters:   11/08/24 42.62 kg/m²      Labs  A1C  No results found for: \"HGBA1C\"  BMP  Lab Results   Component Value Date    CALCIUM 9.6 09/12/2024     09/12/2024    K 3.9 09/12/2024    CO2 30 09/12/2024     09/12/2024    BUN 15 09/12/2024    CREATININE 0.92 09/12/2024    EGFR 73 09/12/2024     LFTs  Lab Results   Component Value Date    ALT 18 09/12/2024    AST 19 09/12/2024    ALKPHOS 91 09/12/2024    BILITOT 1.0 09/12/2024     FLP  Lab Results   Component Value Date    TRIG 110 09/12/2024    CHOL 143 09/12/2024    LDLF 74 04/03/2023    LDLCALC 59 09/12/2024    HDL 62.0 09/12/2024     Urine Microalbumin  No results found for: \"MICROALBCREA\"  Weight Management  Wt Readings from Last 3 Encounters:   11/08/24 106 kg (233 lb)   09/12/24 106 kg (233 lb)   06/18/24 113 kg (249 lb)      There is no height or weight on file to calculate BMI.     Assessment/Plan   Problem List Items Addressed This Visit       Morbid obesity with BMI of 45.0-49.9, adult (Multi)    Relevant Medications    tirzepatide, weight loss, (Zepbound) 7.5 mg/0.5 mL injection    Other Relevant Orders    Referral to Clinical Pharmacy       Patient has been taking Zepbound since 5/2024 and has lost 31 lbs so far. She is maintaining weight loss at about 1 lb per week with the 7.5 mg dose, however did gain afew pounds over the holidays and then subsequently lost them. " Tolerating well, no side effects. Will continue with 7.5 mg for now and follow up in 4 weeks to evaluate tolerability and efficacy.    Medication Changes:  Continue  Zepbound 7.5 mg weekly    Future Considerations:  Titrate Zepbound as tolerated to weight loss goals     Monitoring and Education:  Counseled patient on relevant MOA, expectations, side effects, duration of therapy, contraindications, administration, and monitoring parameters.    Clinical Pharmacist follow-up: 4 weeks, Telehealth visit    Continue all meds under the continuation of care with the referring provider and clinical pharmacy team.    Thank you,  Claribel Fam, PharmD  Clinical Pharmacist  [phone]    Verbal consent to manage patient's drug therapy was obtained from the patient. They were informed they may decline to participate or withdraw from participation in pharmacy services at any time.

## 2025-01-17 ENCOUNTER — APPOINTMENT (OUTPATIENT)
Dept: PHARMACY | Facility: HOSPITAL | Age: 55
End: 2025-01-17
Payer: COMMERCIAL

## 2025-01-17 DIAGNOSIS — E66.01 MORBID OBESITY WITH BMI OF 45.0-49.9, ADULT (MULTI): ICD-10-CM

## 2025-01-17 RX ORDER — TIRZEPATIDE 7.5 MG/.5ML
7.5 INJECTION, SOLUTION SUBCUTANEOUS
Qty: 2 ML | Refills: 3 | Status: SHIPPED | OUTPATIENT
Start: 2025-01-17

## 2025-01-31 PROCEDURE — RXMED WILLOW AMBULATORY MEDICATION CHARGE

## 2025-02-03 ENCOUNTER — PHARMACY VISIT (OUTPATIENT)
Dept: PHARMACY | Facility: CLINIC | Age: 55
End: 2025-02-03
Payer: COMMERCIAL

## 2025-02-14 ENCOUNTER — APPOINTMENT (OUTPATIENT)
Dept: PHARMACY | Facility: HOSPITAL | Age: 55
End: 2025-02-14
Payer: COMMERCIAL

## 2025-02-14 DIAGNOSIS — E66.01 MORBID OBESITY WITH BMI OF 45.0-49.9, ADULT (MULTI): ICD-10-CM

## 2025-02-14 PROCEDURE — RXMED WILLOW AMBULATORY MEDICATION CHARGE

## 2025-02-14 RX ORDER — TIRZEPATIDE 10 MG/.5ML
10 INJECTION, SOLUTION SUBCUTANEOUS
Qty: 2 ML | Refills: 2 | Status: SHIPPED | OUTPATIENT
Start: 2025-02-14

## 2025-02-14 NOTE — PROGRESS NOTES
Clinical Pharmacy Appointment    Patient ID: Indira Solomon is a 55 y.o. female who presents for Obesity.    Pt is here for Follow Up appointment.     Referring Provider: Luis Kulkarni DO  PCP: Luis Kulkarni DO   Last visit with PCP: 9/12/24   Next visit with PCP: not yet scheduled     Subjective   HPI  WEIGHT LOSS  PMH significant for HTN, HLD, obesity.  Special needs/barriers to therapy: None identified    BMI Readings from Last 1 Encounters:   11/08/24 42.62 kg/m²   Starting weight: 250 lbs  Current weight: 219 lbs 1/17/2025  Weight loss: down 32 lbs      Lab Results   Component Value Date    GLUCOSE 94 09/12/2024   Hyperglycemia: Denies signs and symptoms    Lifestyle  Diet: eating healthier   Has worked with nutritionist/dietician? No    Physical Activity: increasing exercise     Other Potential Contributing Factors  Medications that may cause weight gain: none  Mental health: No current concerns  Eating Disorders: Denies Hx of any eating disorder  Comorbidities: dyslipidemias and hypertension    Non-Pharmacological Therapy  Weight loss techniques attempted:  Medications     Pharmacological Therapy  Current Medications: Zepbound 7.5 mg   Previous Medications: n/a     Adherence: Takes medication as directed and reports no missed doses  Adverse Effects: nausea     Insurance coverage of weight-loss medications? No, plan exclusion  Eligible for copay cards/programs? Yes, commercial insurance       Preventative Care  ASCVD Risk:   The 10-year ASCVD risk score (Wilton HAYWOOD, et al., 2019) is: 2%    Values used to calculate the score:      Age: 55 years      Sex: Female      Is Non- : No      Diabetic: No      Tobacco smoker: No      Systolic Blood Pressure: 122 mmHg      Is BP treated: Yes      HDL Cholesterol: 62 mg/dL      Total Cholesterol: 143 mg/dL  On Statin?: Yes, rosuvastatin 5 mg    Immunizations Needed: All up-to-date and documented  Tobacco Use:  former smoker    Drug  "Interactions  No relevant drug interactions were noted.    Objective   No Known Allergies  Social History     Social History Narrative    Not on file      Medication Review  Current Outpatient Medications   Medication Instructions    lisinopriL-hydrochlorothiazide 20-12.5 mg tablet 1 tablet, oral, Daily    rosuvastatin (CRESTOR) 10 mg, oral, Daily    saccharomyces boulardii (FLORASTOR) 250 mg, oral, 2 times daily    Zepbound 10 mg, subcutaneous, Every 7 days      Vitals  BP Readings from Last 2 Encounters:   09/12/24 122/82   06/18/24 113/75     BMI Readings from Last 1 Encounters:   11/08/24 42.62 kg/m²      Labs  A1C  No results found for: \"HGBA1C\"  BMP  Lab Results   Component Value Date    CALCIUM 9.6 09/12/2024     09/12/2024    K 3.9 09/12/2024    CO2 30 09/12/2024     09/12/2024    BUN 15 09/12/2024    CREATININE 0.92 09/12/2024    EGFR 73 09/12/2024     LFTs  Lab Results   Component Value Date    ALT 18 09/12/2024    AST 19 09/12/2024    ALKPHOS 91 09/12/2024    BILITOT 1.0 09/12/2024     FLP  Lab Results   Component Value Date    TRIG 110 09/12/2024    CHOL 143 09/12/2024    LDLF 74 04/03/2023    LDLCALC 59 09/12/2024    HDL 62.0 09/12/2024     Urine Microalbumin  No results found for: \"MICROALBCREA\"  Weight Management  Wt Readings from Last 3 Encounters:   11/08/24 106 kg (233 lb)   09/12/24 106 kg (233 lb)   06/18/24 113 kg (249 lb)      There is no height or weight on file to calculate BMI.     Assessment/Plan   Problem List Items Addressed This Visit       Morbid obesity with BMI of 45.0-49.9, adult (Multi)    Relevant Medications    tirzepatide, weight loss, (Zepbound) 10 mg/0.5 mL injection    Other Relevant Orders    Referral to Clinical Pharmacy     Patient has been taking Zepbound since 5/2024 and has lost 32 lbs so far. She has not lost any weight this past month on the 7.5 mg dose. Tolerating well, no side effects. Will increase to 10 mg and follow up in 4 weeks to evaluate " tolerability and efficacy.    Medication Changes:  Increase  Zepbound 7.5 mg to 10 mg weekly    Future Considerations:  Titrate Zepbound as tolerated to weight loss goals     Monitoring and Education:  Counseled patient on relevant MOA, expectations, side effects, duration of therapy, contraindications, administration, and monitoring parameters.    Clinical Pharmacist follow-up: 4 weeks, Telehealth visit    Continue all meds under the continuation of care with the referring provider and clinical pharmacy team.    Thank you,  Claribel Fam, PharmD  Clinical Pharmacist  [phone]    Verbal consent to manage patient's drug therapy was obtained from the patient. They were informed they may decline to participate or withdraw from participation in pharmacy services at any time.

## 2025-02-17 ENCOUNTER — PHARMACY VISIT (OUTPATIENT)
Dept: PHARMACY | Facility: CLINIC | Age: 55
End: 2025-02-17
Payer: COMMERCIAL

## 2025-03-10 DIAGNOSIS — E78.2 MIXED HYPERLIPIDEMIA: ICD-10-CM

## 2025-03-10 DIAGNOSIS — I10 BENIGN ESSENTIAL HTN: ICD-10-CM

## 2025-03-11 RX ORDER — LISINOPRIL AND HYDROCHLOROTHIAZIDE 12.5; 2 MG/1; MG/1
1 TABLET ORAL DAILY
Qty: 30 TABLET | Refills: 0 | Status: SHIPPED | OUTPATIENT
Start: 2025-03-11 | End: 2025-04-10

## 2025-03-11 RX ORDER — ROSUVASTATIN CALCIUM 10 MG/1
10 TABLET, COATED ORAL DAILY
Qty: 30 TABLET | Refills: 0 | Status: SHIPPED | OUTPATIENT
Start: 2025-03-11 | End: 2025-04-10

## 2025-03-14 ENCOUNTER — APPOINTMENT (OUTPATIENT)
Dept: PHARMACY | Facility: HOSPITAL | Age: 55
End: 2025-03-14
Payer: COMMERCIAL

## 2025-03-14 DIAGNOSIS — E66.01 MORBID OBESITY WITH BMI OF 45.0-49.9, ADULT (MULTI): ICD-10-CM

## 2025-03-14 PROCEDURE — RXMED WILLOW AMBULATORY MEDICATION CHARGE

## 2025-03-14 NOTE — PROGRESS NOTES
Clinical Pharmacy Appointment    Patient ID: Indira Solomon is a 55 y.o. female who presents for Obesity.    Pt is here for Follow Up appointment.     Referring Provider: Luis Kulkarni DO  PCP: Luis Kulkarni DO   Last visit with PCP: 9/12/24   Next visit with PCP: 3/28/2025     Subjective   HPI  WEIGHT LOSS  PMH significant for HTN, HLD, obesity.  Special needs/barriers to therapy: None identified    BMI Readings from Last 1 Encounters:   11/08/24 42.62 kg/m²   Starting weight: 250 lbs  Current weight: 210 lbs 3/14/2025  Weight loss: down 40 lbs      Lab Results   Component Value Date    GLUCOSE 94 09/12/2024   Hyperglycemia: Denies signs and symptoms    Lifestyle  Diet: eating healthier   Has worked with nutritionist/dietician? No    Physical Activity: increasing exercise     Other Potential Contributing Factors  Medications that may cause weight gain: none  Mental health: No current concerns  Eating Disorders: Denies Hx of any eating disorder  Comorbidities: dyslipidemias and hypertension    Non-Pharmacological Therapy  Weight loss techniques attempted:  Medications     Pharmacological Therapy  Current Medications: Zepbound 10 mg   Previous Medications: n/a     Adherence: Takes medication as directed and reports no missed doses  Adverse Effects: nausea     Insurance coverage of weight-loss medications? No, plan exclusion  Eligible for copay cards/programs? Yes, commercial insurance       Preventative Care  ASCVD Risk:   The 10-year ASCVD risk score (Wilton HAYWOOD, et al., 2019) is: 2%    Values used to calculate the score:      Age: 55 years      Sex: Female      Is Non- : No      Diabetic: No      Tobacco smoker: No      Systolic Blood Pressure: 122 mmHg      Is BP treated: Yes      HDL Cholesterol: 62 mg/dL      Total Cholesterol: 143 mg/dL  On Statin?: Yes, rosuvastatin 5 mg    Immunizations Needed: All up-to-date and documented  Tobacco Use:  former smoker    Drug Interactions  No  "relevant drug interactions were noted.    Objective   No Known Allergies  Social History     Social History Narrative    Not on file      Medication Review  Current Outpatient Medications   Medication Instructions    lisinopriL-hydrochlorothiazide 20-12.5 mg tablet 1 tablet, oral, Daily    rosuvastatin (CRESTOR) 10 mg, oral, Daily    saccharomyces boulardii (FLORASTOR) 250 mg, oral, 2 times daily    Zepbound 10 mg, subcutaneous, Every 7 days      Vitals  BP Readings from Last 2 Encounters:   09/12/24 122/82   06/18/24 113/75     BMI Readings from Last 1 Encounters:   11/08/24 42.62 kg/m²      Labs  A1C  No results found for: \"HGBA1C\"  BMP  Lab Results   Component Value Date    CALCIUM 9.6 09/12/2024     09/12/2024    K 3.9 09/12/2024    CO2 30 09/12/2024     09/12/2024    BUN 15 09/12/2024    CREATININE 0.92 09/12/2024    EGFR 73 09/12/2024     LFTs  Lab Results   Component Value Date    ALT 18 09/12/2024    AST 19 09/12/2024    ALKPHOS 91 09/12/2024    BILITOT 1.0 09/12/2024     FLP  Lab Results   Component Value Date    TRIG 110 09/12/2024    CHOL 143 09/12/2024    LDLF 74 04/03/2023    LDLCALC 59 09/12/2024    HDL 62.0 09/12/2024     Urine Microalbumin  No results found for: \"MICROALBCREA\"  Weight Management  Wt Readings from Last 3 Encounters:   11/08/24 106 kg (233 lb)   09/12/24 106 kg (233 lb)   06/18/24 113 kg (249 lb)      There is no height or weight on file to calculate BMI.     Assessment/Plan   Problem List Items Addressed This Visit       Morbid obesity with BMI of 45.0-49.9, adult (Multi)    Relevant Orders    Referral to Clinical Pharmacy     Patient has been taking Zepbound since 5/2024 and has lost 40 lbs so far. Weight loss noted on current dose. Tolerating well, no side effects. Will continue the 10 mg and follow up in 4 weeks to evaluate tolerability and efficacy.    Medication Changes:  Continue  Zepbound 10 mg weekly    Future Considerations:  Titrate Zepbound as tolerated to " weight loss goals     Monitoring and Education:  Counseled patient on relevant MOA, expectations, side effects, duration of therapy, contraindications, administration, and monitoring parameters.    Clinical Pharmacist follow-up: 4 weeks, Telehealth visit    Continue all meds under the continuation of care with the referring provider and clinical pharmacy team.    Thank you,  Claribel Fam, PharmD  Clinical Pharmacist  [phone]    Verbal consent to manage patient's drug therapy was obtained from the patient. They were informed they may decline to participate or withdraw from participation in pharmacy services at any time.

## 2025-03-17 ENCOUNTER — PHARMACY VISIT (OUTPATIENT)
Dept: PHARMACY | Facility: CLINIC | Age: 55
End: 2025-03-17
Payer: COMMERCIAL

## 2025-03-28 ENCOUNTER — APPOINTMENT (OUTPATIENT)
Dept: PRIMARY CARE | Facility: CLINIC | Age: 55
End: 2025-03-28
Payer: COMMERCIAL

## 2025-03-28 VITALS
HEART RATE: 68 BPM | SYSTOLIC BLOOD PRESSURE: 124 MMHG | WEIGHT: 208 LBS | HEIGHT: 62 IN | BODY MASS INDEX: 38.28 KG/M2 | DIASTOLIC BLOOD PRESSURE: 80 MMHG

## 2025-03-28 DIAGNOSIS — E66.01 MORBID OBESITY WITH BMI OF 45.0-49.9, ADULT (MULTI): ICD-10-CM

## 2025-03-28 DIAGNOSIS — E78.2 MIXED HYPERLIPIDEMIA: ICD-10-CM

## 2025-03-28 DIAGNOSIS — I10 BENIGN ESSENTIAL HTN: Primary | ICD-10-CM

## 2025-03-28 PROBLEM — R07.89 ATYPICAL CHEST PAIN: Status: RESOLVED | Noted: 2024-05-07 | Resolved: 2025-03-28

## 2025-03-28 PROBLEM — L82.1 SEBORRHEIC KERATOSIS: Status: RESOLVED | Noted: 2024-09-12 | Resolved: 2025-03-28

## 2025-03-28 PROBLEM — H25.9 AGE-RELATED CATARACT OF BOTH EYES: Status: RESOLVED | Noted: 2024-05-01 | Resolved: 2025-03-28

## 2025-03-28 PROCEDURE — 1036F TOBACCO NON-USER: CPT | Performed by: FAMILY MEDICINE

## 2025-03-28 PROCEDURE — 99214 OFFICE O/P EST MOD 30 MIN: CPT | Performed by: FAMILY MEDICINE

## 2025-03-28 PROCEDURE — 3008F BODY MASS INDEX DOCD: CPT | Performed by: FAMILY MEDICINE

## 2025-03-28 PROCEDURE — 3074F SYST BP LT 130 MM HG: CPT | Performed by: FAMILY MEDICINE

## 2025-03-28 PROCEDURE — 3079F DIAST BP 80-89 MM HG: CPT | Performed by: FAMILY MEDICINE

## 2025-03-28 RX ORDER — LISINOPRIL AND HYDROCHLOROTHIAZIDE 12.5; 2 MG/1; MG/1
1 TABLET ORAL DAILY
Qty: 90 TABLET | Refills: 1 | Status: SHIPPED | OUTPATIENT
Start: 2025-03-28 | End: 2025-09-24

## 2025-03-28 RX ORDER — ROSUVASTATIN CALCIUM 10 MG/1
10 TABLET, COATED ORAL DAILY
Qty: 90 TABLET | Refills: 1 | Status: SHIPPED | OUTPATIENT
Start: 2025-03-28 | End: 2025-09-24

## 2025-03-28 ASSESSMENT — PATIENT HEALTH QUESTIONNAIRE - PHQ9
1. LITTLE INTEREST OR PLEASURE IN DOING THINGS: NOT AT ALL
SUM OF ALL RESPONSES TO PHQ9 QUESTIONS 1 AND 2: 0
2. FEELING DOWN, DEPRESSED OR HOPELESS: NOT AT ALL

## 2025-03-28 NOTE — ASSESSMENT & PLAN NOTE
Congratulations on your continued weight loss  Keep up the great work  Continue to follow with both us and clinical pharmacist per protocol  No changes to Zepbound are recommended at this time

## 2025-03-28 NOTE — PROGRESS NOTES
Chief Complaint  Patient ID: Indira Solomon is a 55 y.o. female who presents for Hypertension and Hyperlipidemia.    Past Medical, Surgical, and Family History reviewed and updated in chart.    Reviewed all medications by prescribing practitioner or clinical pharmacist (such as prescriptions, OTCs, herbal therapies and supplements) and documented in the medical record.    History of Present Illness  1. Hyperlipidemia    - The last lipid panel was completed 6 months ago, showing an LDL of 59. Currently taking Crestor 10 mg daily.    - Indira is tolerating the medication well and requests a refill at this time.    2. Hypertension    - Blood pressure today on intake was 124/80.    - Current antihypertensive regimen includes lisinopril-hydrochlorothiazide 20-12.5 mg daily.    - Denies any chest pain or shortness of breath.    Review of Systems  All pertinent positive symptoms are included in the history of present illness.    3. Morbid obesity   Steady weight loss has been achieved on Zepbound 10 mg weekly injection     All other systems have been reviewed and are negative and noncontributory to this patient's current ailments.    Past Medical History  She has a past medical history of Hyperlipidemia, Hypertension, Laceration without foreign body, right foot, initial encounter (10/14/2015), Other microscopic hematuria (2018), Primary osteoarthritis of right knee (2023), Tinnitus, bilateral (2019), and Tubular adenoma (2020).    Surgical History  She has a past surgical history that includes  section, classic (10/07/2013); Hysteroscopy (10/07/2013); Tubal ligation (Bilateral, 2020); Cholecystectomy (2017); Colonoscopy (2020); Appendectomy;  section, low transverse ([id number]); Eye surgery (); and Endometrial ablation ().     Social History  She reports that she has quit smoking. Her smoking use included cigarettes. She has been exposed to tobacco  "smoke. She has never used smokeless tobacco. She reports current alcohol use. She reports that she does not use drugs.    Family History   Problem Relation Name Age of Onset    Diabetes Father Iron Sanders     Hypertension Father Iron Sanders         Essential    Asthma Son Duane Solomon     Cancer Mother's Brother       Outpatient Medications Prior to Visit   Medication Sig Dispense Refill    saccharomyces boulardii (Florastor) 250 mg capsule Take 1 capsule (250 mg) by mouth 2 times a day.      tirzepatide, weight loss, (Zepbound) 10 mg/0.5 mL injection Inject 10 mg under the skin every 7 days. 2 mL 2    lisinopriL-hydrochlorothiazide 20-12.5 mg tablet Take 1 tablet by mouth once daily. 30 tablet 0    rosuvastatin (Crestor) 10 mg tablet Take 1 tablet (10 mg) by mouth once daily. 30 tablet 0     No facility-administered medications prior to visit.     Allergies  Patient has no known allergies.    Immunization History   Administered Date(s) Administered    COVID-19, mRNA, LNP-S, PF, 30 mcg/0.3 mL dose 03/23/2021, 04/13/2021, 12/18/2021    DTaP vaccine, pediatric  (INFANRIX) 09/10/2021    Flu vaccine (IIV4), preservative free *Check age/dose* 10/14/2015, 11/01/2018, 11/06/2019, 10/16/2023    Flu vaccine, quadrivalent, no egg protein, age 6 month or greater (FLUCELVAX) 10/26/2021    Influenza, seasonal, injectable 09/29/2022    Tdap vaccine, age 7 year and older (BOOSTRIX, ADACEL) 10/07/2013, 09/10/2021    Zoster vaccine, recombinant, adult (SHINGRIX) 10/26/2021, 03/17/2022     Objective   Visit Vitals  /80   Pulse 68   Ht 1.575 m (5' 2\")   Wt 94.3 kg (208 lb)   BMI 38.04 kg/m²   OB Status Ablation   Smoking Status Former   BSA 2.03 m²        BP Readings from Last 3 Encounters:   03/28/25 124/80   09/12/24 122/82   06/18/24 113/75      Wt Readings from Last 3 Encounters:   03/28/25 94.3 kg (208 lb)   11/08/24 106 kg (233 lb)   09/12/24 106 kg (233 lb)      Relevant Results  No visits with results within 1 " Month(s) from this visit.   Latest known visit with results is:   Lab on 09/12/2024   Component Date Value    Cholesterol 09/12/2024 143     HDL-Cholesterol 09/12/2024 62.0     Cholesterol/HDL Ratio 09/12/2024 2.3     LDL Calculated 09/12/2024 59     VLDL 09/12/2024 22     Triglycerides 09/12/2024 110     Non HDL Cholesterol 09/12/2024 81     Thyroid Stimulating Horm* 09/12/2024 2.88     Glucose 09/12/2024 94     Sodium 09/12/2024 136     Potassium 09/12/2024 3.9     Chloride 09/12/2024 101     Bicarbonate 09/12/2024 30     Anion Gap 09/12/2024 9 (L)     Urea Nitrogen 09/12/2024 15     Creatinine 09/12/2024 0.92     eGFR 09/12/2024 73     Calcium 09/12/2024 9.6     Albumin 09/12/2024 4.6     Alkaline Phosphatase 09/12/2024 91     Total Protein 09/12/2024 7.6     AST 09/12/2024 19     Bilirubin, Total 09/12/2024 1.0     ALT 09/12/2024 18     WBC 09/12/2024 5.9     nRBC 09/12/2024 0.0     RBC 09/12/2024 4.86     Hemoglobin 09/12/2024 14.2     Hematocrit 09/12/2024 42.0     MCV 09/12/2024 86     MCH 09/12/2024 29.2     MCHC 09/12/2024 33.8     RDW 09/12/2024 12.8     Platelets 09/12/2024 221     Neutrophils % 09/12/2024 59.0     Immature Granulocytes %,* 09/12/2024 0.3     Lymphocytes % 09/12/2024 30.3     Monocytes % 09/12/2024 8.2     Eosinophils % 09/12/2024 1.7     Basophils % 09/12/2024 0.5     Neutrophils Absolute 09/12/2024 3.45     Immature Granulocytes Ab* 09/12/2024 0.02     Lymphocytes Absolute 09/12/2024 1.77     Monocytes Absolute 09/12/2024 0.48     Eosinophils Absolute 09/12/2024 0.10     Basophils Absolute 09/12/2024 0.03      The 10-year ASCVD risk score (Frem[place] DK, et al., 2019) is: 2%    Values used to calculate the score:      Age: 55 years      Sex: Female      Is Non- : No      Diabetic: No      Tobacco smoker: No      Systolic Blood Pressure: 124 mmHg      Is BP treated: Yes      HDL Cholesterol: 62 mg/dL      Total Cholesterol: 143 mg/dL    Physical Exam  CONSTITUTIONAL -  well nourished, well developed, looks like stated age, in no acute distress, not ill-appearing, and not tired appearing  SKIN - normal skin color and pigmentation, normal skin turgor without rash, lesions, or nodules visualized  HEAD - no trauma, normocephalic  EYES - pupils are equal and reactive to light, extraocular muscles are intact, and normal external exam  CHEST - clear to auscultation, no wheezing, no crackles and no rales, good effort  CARDIAC - regular rate and regular rhythm, no skipped beats, no murmur  EXTREMITIES - no obvious or evident edema, no obvious or evident deformities  NEUROLOGICAL alert, oriented and no focal signs  PSYCHIATRIC - alert, pleasant and cordial, age-appropriate    Assessment and Plan  Problem List Items Addressed This Visit       Benign essential HTN - Primary     Stable, no changes to medication recommended         Relevant Medications    lisinopriL-hydrochlorothiazide 20-12.5 mg tablet    Other Relevant Orders    Comprehensive metabolic panel    Lipid panel    Mixed hyperlipidemia     Obtain fasting blood work after which I can adjust medications accordingly or continue with current dose         Relevant Medications    rosuvastatin (Crestor) 10 mg tablet    Other Relevant Orders    Comprehensive metabolic panel    Lipid panel    Morbid obesity with BMI of 45.0-49.9, adult (Multi)     Congratulations on your continued weight loss  Keep up the great work  Continue to follow with both us and clinical pharmacist per protocol  No changes to Zepbound are recommended at this time

## 2025-03-29 LAB
ALBUMIN SERPL-MCNC: 4.8 G/DL (ref 3.6–5.1)
ALP SERPL-CCNC: 87 U/L (ref 37–153)
ALT SERPL-CCNC: 17 U/L (ref 6–29)
ANION GAP SERPL CALCULATED.4IONS-SCNC: 8 MMOL/L (CALC) (ref 7–17)
AST SERPL-CCNC: 17 U/L (ref 10–35)
BILIRUB SERPL-MCNC: 0.7 MG/DL (ref 0.2–1.2)
BUN SERPL-MCNC: 16 MG/DL (ref 7–25)
CALCIUM SERPL-MCNC: 9.8 MG/DL (ref 8.6–10.4)
CHLORIDE SERPL-SCNC: 104 MMOL/L (ref 98–110)
CHOLEST SERPL-MCNC: 170 MG/DL
CHOLEST/HDLC SERPL: 2.5 (CALC)
CO2 SERPL-SCNC: 29 MMOL/L (ref 20–32)
CREAT SERPL-MCNC: 0.83 MG/DL (ref 0.5–1.03)
EGFRCR SERPLBLD CKD-EPI 2021: 82 ML/MIN/1.73M2
GLUCOSE SERPL-MCNC: 90 MG/DL (ref 65–99)
HDLC SERPL-MCNC: 67 MG/DL
LDLC SERPL CALC-MCNC: 81 MG/DL (CALC)
NONHDLC SERPL-MCNC: 103 MG/DL (CALC)
POTASSIUM SERPL-SCNC: 4.1 MMOL/L (ref 3.5–5.3)
PROT SERPL-MCNC: 7.2 G/DL (ref 6.1–8.1)
SODIUM SERPL-SCNC: 141 MMOL/L (ref 135–146)
TRIGL SERPL-MCNC: 119 MG/DL

## 2025-04-11 ENCOUNTER — APPOINTMENT (OUTPATIENT)
Dept: PHARMACY | Facility: HOSPITAL | Age: 55
End: 2025-04-11
Payer: COMMERCIAL

## 2025-04-11 DIAGNOSIS — E66.01 MORBID OBESITY WITH BMI OF 45.0-49.9, ADULT (MULTI): ICD-10-CM

## 2025-04-11 NOTE — PROGRESS NOTES
Clinical Pharmacy Appointment    Patient ID: Indira Soolmon is a 55 y.o. female who presents for Obesity.    Pt is here for Follow Up appointment.     Referring Provider: Luis Kulkarni DO  PCP: Luis Kulkarni DO   Last visit with PCP: 9/12/24   Next visit with PCP: 3/28/2025     Subjective   HPI  WEIGHT LOSS  PMH significant for HTN, HLD, obesity.  Special needs/barriers to therapy: None identified    BMI Readings from Last 1 Encounters:   03/28/25 38.04 kg/m²   Starting weight: 250 lbs  Current weight: 206 lbs 4/11/2025  Weight loss: down 44 lbs      Lab Results   Component Value Date    GLUCOSE 90 03/28/2025   Hyperglycemia: Denies signs and symptoms    Lifestyle  Diet: eating healthier   Has worked with nutritionist/dietician? No    Physical Activity: increasing exercise     Other Potential Contributing Factors  Medications that may cause weight gain: none  Mental health: No current concerns  Eating Disorders: Denies Hx of any eating disorder  Comorbidities: dyslipidemias and hypertension    Non-Pharmacological Therapy  Weight loss techniques attempted:  Medications     Pharmacological Therapy  Current Medications: Zepbound 10 mg   Previous Medications: n/a     Adherence: Takes medication as directed and reports no missed doses  Adverse Effects: nausea     Insurance coverage of weight-loss medications? No, plan exclusion  Eligible for copay cards/programs? Yes, commercial insurance       Preventative Care  ASCVD Risk:   The 10-year ASCVD risk score (Wilton HAYWOOD, et al., 2019) is: 2.2%    Values used to calculate the score:      Age: 55 years      Sex: Female      Is Non- : No      Diabetic: No      Tobacco smoker: No      Systolic Blood Pressure: 124 mmHg      Is BP treated: Yes      HDL Cholesterol: 67 mg/dL      Total Cholesterol: 170 mg/dL  On Statin?: Yes, rosuvastatin 5 mg    Immunizations Needed: All up-to-date and documented  Tobacco Use:  former smoker    Drug Interactions  No  "relevant drug interactions were noted.    Objective   No Known Allergies  Social History     Social History Narrative    Not on file      Medication Review  Current Outpatient Medications   Medication Instructions    lisinopriL-hydrochlorothiazide 20-12.5 mg tablet 1 tablet, oral, Daily    rosuvastatin (CRESTOR) 10 mg, oral, Daily    saccharomyces boulardii (FLORASTOR) 250 mg, oral, 2 times daily    Zepbound 10 mg, subcutaneous, Every 7 days      Vitals  BP Readings from Last 2 Encounters:   03/28/25 124/80   09/12/24 122/82     BMI Readings from Last 1 Encounters:   03/28/25 38.04 kg/m²      Labs  A1C  No results found for: \"HGBA1C\"  BMP  Lab Results   Component Value Date    CALCIUM 9.8 03/28/2025     03/28/2025    K 4.1 03/28/2025    CO2 29 03/28/2025     03/28/2025    BUN 16 03/28/2025    CREATININE 0.83 03/28/2025    EGFR 82 03/28/2025     LFTs  Lab Results   Component Value Date    ALT 17 03/28/2025    AST 17 03/28/2025    ALKPHOS 87 03/28/2025    BILITOT 0.7 03/28/2025     FLP  Lab Results   Component Value Date    TRIG 119 03/28/2025    CHOL 170 03/28/2025    LDLF 74 04/03/2023    LDLCALC 81 03/28/2025    HDL 67 03/28/2025     Urine Microalbumin  No results found for: \"MICROALBCREA\"  Weight Management  Wt Readings from Last 3 Encounters:   03/28/25 94.3 kg (208 lb)   11/08/24 106 kg (233 lb)   09/12/24 106 kg (233 lb)      There is no height or weight on file to calculate BMI.     Assessment/Plan   Problem List Items Addressed This Visit       Morbid obesity with BMI of 45.0-49.9, adult (Multi)    Relevant Orders    Referral to Clinical Pharmacy     Patient has been taking Zepbound since 5/2024 and has lost 40 lbs so far. Weight loss noted on current dose. Tolerating well, no side effects. Will continue the 10 mg and follow up in 4 weeks to evaluate tolerability and efficacy.    Medication Changes:  Continue  Zepbound 10 mg weekly    Future Considerations:  Titrate Zepbound as tolerated to weight " loss goals     Monitoring and Education:  Counseled patient on relevant MOA, expectations, side effects, duration of therapy, contraindications, administration, and monitoring parameters.    Clinical Pharmacist follow-up: 4 weeks, Telehealth visit    Continue all meds under the continuation of care with the referring provider and clinical pharmacy team.    Thank you,  Claribel Fam, PharmD  Clinical Pharmacist  [phone]    Verbal consent to manage patient's drug therapy was obtained from the patient. They were informed they may decline to participate or withdraw from participation in pharmacy services at any time.

## 2025-04-14 PROCEDURE — RXMED WILLOW AMBULATORY MEDICATION CHARGE

## 2025-04-15 ENCOUNTER — PHARMACY VISIT (OUTPATIENT)
Dept: PHARMACY | Facility: CLINIC | Age: 55
End: 2025-04-15
Payer: COMMERCIAL

## 2025-05-09 ENCOUNTER — APPOINTMENT (OUTPATIENT)
Dept: PHARMACY | Facility: HOSPITAL | Age: 55
End: 2025-05-09
Payer: COMMERCIAL

## 2025-05-09 ENCOUNTER — PHARMACY VISIT (OUTPATIENT)
Dept: PHARMACY | Facility: CLINIC | Age: 55
End: 2025-05-09
Payer: COMMERCIAL

## 2025-05-09 DIAGNOSIS — E66.01 MORBID OBESITY WITH BMI OF 45.0-49.9, ADULT (MULTI): ICD-10-CM

## 2025-05-09 PROCEDURE — RXMED WILLOW AMBULATORY MEDICATION CHARGE

## 2025-05-09 RX ORDER — TIRZEPATIDE 10 MG/.5ML
10 INJECTION, SOLUTION SUBCUTANEOUS
Qty: 2 ML | Refills: 2 | Status: SHIPPED | OUTPATIENT
Start: 2025-05-09

## 2025-05-09 NOTE — PROGRESS NOTES
Clinical Pharmacy Appointment    Patient ID: Indira Solomon is a 55 y.o. female who presents for Obesity.    Pt is here for Follow Up appointment.     Referring Provider: Luis Kulkarni DO  PCP: Luis Kulkarni DO   Last visit with PCP: 9/12/24   Next visit with PCP: 3/28/2025     Subjective   HPI  WEIGHT LOSS  PMH significant for HTN, HLD, obesity.  Special needs/barriers to therapy: None identified    BMI Readings from Last 1 Encounters:   03/28/25 38.04 kg/m²   Starting weight: 250 lbs  Current weight: 201 lbs 5/9/2025  Weight loss: down 49 lbs      Lab Results   Component Value Date    GLUCOSE 90 03/28/2025   Hyperglycemia: Denies signs and symptoms    Lifestyle  Diet: eating healthier   Has worked with nutritionist/dietician? No    Physical Activity: increasing exercise     Other Potential Contributing Factors  Medications that may cause weight gain: none  Mental health: No current concerns  Eating Disorders: Denies Hx of any eating disorder  Comorbidities: dyslipidemias and hypertension    Non-Pharmacological Therapy  Weight loss techniques attempted:  Medications     Pharmacological Therapy  Current Medications: Zepbound 10 mg   Previous Medications: n/a     Adherence: Takes medication as directed and reports no missed doses  Adverse Effects: nausea     Insurance coverage of weight-loss medications? No, plan exclusion  Eligible for copay cards/programs? Yes, commercial insurance       Preventative Care  ASCVD Risk:   The 10-year ASCVD risk score (Wilton HAYWOOD, et al., 2019) is: 2.2%    Values used to calculate the score:      Age: 55 years      Sex: Female      Is Non- : No      Diabetic: No      Tobacco smoker: No      Systolic Blood Pressure: 124 mmHg      Is BP treated: Yes      HDL Cholesterol: 67 mg/dL      Total Cholesterol: 170 mg/dL  On Statin?: Yes, rosuvastatin 5 mg    Immunizations Needed: All up-to-date and documented  Tobacco Use: former smoker    Drug Interactions  No  "relevant drug interactions were noted.    Objective   No Known Allergies  Social History     Social History Narrative    Not on file      Medication Review  Current Outpatient Medications   Medication Instructions    lisinopriL-hydrochlorothiazide 20-12.5 mg tablet 1 tablet, oral, Daily    rosuvastatin (CRESTOR) 10 mg, oral, Daily    saccharomyces boulardii (FLORASTOR) 250 mg, oral, 2 times daily    Zepbound 10 mg, subcutaneous, Every 7 days      Vitals  BP Readings from Last 2 Encounters:   03/28/25 124/80   09/12/24 122/82     BMI Readings from Last 1 Encounters:   03/28/25 38.04 kg/m²      Labs  A1C  No results found for: \"HGBA1C\"  BMP  Lab Results   Component Value Date    CALCIUM 9.8 03/28/2025     03/28/2025    K 4.1 03/28/2025    CO2 29 03/28/2025     03/28/2025    BUN 16 03/28/2025    CREATININE 0.83 03/28/2025    EGFR 82 03/28/2025     LFTs  Lab Results   Component Value Date    ALT 17 03/28/2025    AST 17 03/28/2025    ALKPHOS 87 03/28/2025    BILITOT 0.7 03/28/2025     FLP  Lab Results   Component Value Date    TRIG 119 03/28/2025    CHOL 170 03/28/2025    LDLF 74 04/03/2023    LDLCALC 81 03/28/2025    HDL 67 03/28/2025     Urine Microalbumin  No results found for: \"MICROALBCREA\"  Weight Management  Wt Readings from Last 3 Encounters:   03/28/25 94.3 kg (208 lb)   11/08/24 106 kg (233 lb)   09/12/24 106 kg (233 lb)      There is no height or weight on file to calculate BMI.     Assessment/Plan   Problem List Items Addressed This Visit       Morbid obesity with BMI of 45.0-49.9, adult (Multi)    Relevant Medications    tirzepatide, weight loss, (Zepbound) 10 mg/0.5 mL injection    Other Relevant Orders    Referral to Clinical Pharmacy     Patient has been taking Zepbound since 5/2024 and has lost 49 lbs so far. Weight loss noted on current dose. Tolerating well, no side effects. Will continue the 10 mg and follow up in 4 weeks to evaluate tolerability and efficacy.    Medication " Changes:  Continue  Zepbound 10 mg weekly    Future Considerations:  Titrate Zepbound as tolerated to weight loss goals     Monitoring and Education:  Counseled patient on relevant MOA, expectations, side effects, duration of therapy, contraindications, administration, and monitoring parameters.    Clinical Pharmacist follow-up: 4 weeks, Telehealth visit    Continue all meds under the continuation of care with the referring provider and clinical pharmacy team.    Thank you,  Claribel Fam, PharmD  Clinical Pharmacist  [phone]    Verbal consent to manage patient's drug therapy was obtained from the patient. They were informed they may decline to participate or withdraw from participation in pharmacy services at any time.

## 2025-06-02 DIAGNOSIS — I10 BENIGN ESSENTIAL HTN: ICD-10-CM

## 2025-06-02 RX ORDER — LISINOPRIL AND HYDROCHLOROTHIAZIDE 12.5; 2 MG/1; MG/1
0.5 TABLET ORAL DAILY
Qty: 45 TABLET | Refills: 1 | Status: SHIPPED | OUTPATIENT
Start: 2025-06-02 | End: 2025-11-29

## 2025-06-06 ENCOUNTER — APPOINTMENT (OUTPATIENT)
Dept: PHARMACY | Facility: HOSPITAL | Age: 55
End: 2025-06-06
Payer: COMMERCIAL

## 2025-06-06 DIAGNOSIS — E66.01 MORBID OBESITY WITH BMI OF 45.0-49.9, ADULT (MULTI): ICD-10-CM

## 2025-06-06 PROCEDURE — RXMED WILLOW AMBULATORY MEDICATION CHARGE

## 2025-06-06 NOTE — PROGRESS NOTES
Clinical Pharmacy Appointment    Patient ID: Indira Solomon is a 55 y.o. female who presents for Obesity.    Pt is here for Follow Up appointment.     Referring Provider: Luis Kulkarni DO  PCP: Luis Kulkarni DO   Last visit with PCP: 9/12/24   Next visit with PCP: 3/28/2025     Subjective   HPI  WEIGHT LOSS  PMH significant for HTN, HLD, obesity.  Special needs/barriers to therapy: None identified    BMI Readings from Last 1 Encounters:   03/28/25 38.04 kg/m²   Starting weight: 250 lbs  Current weight: 199 lbs 6/6/2025  Weight loss: down 52 lbs      Lab Results   Component Value Date    GLUCOSE 90 03/28/2025   Hyperglycemia: Denies signs and symptoms    Lifestyle  Diet: eating healthier   Has worked with nutritionist/dietician? No    Physical Activity: increasing exercise     Other Potential Contributing Factors  Medications that may cause weight gain: none  Mental health: No current concerns  Eating Disorders: Denies Hx of any eating disorder  Comorbidities: dyslipidemias and hypertension    Non-Pharmacological Therapy  Weight loss techniques attempted:  Medications     Pharmacological Therapy  Current Medications: Zepbound 10 mg   Previous Medications: n/a     Adherence: Takes medication as directed and reports no missed doses  Adverse Effects: nausea     Insurance coverage of weight-loss medications? No, plan exclusion  Eligible for copay cards/programs? Yes, commercial insurance       Preventative Care  ASCVD Risk:   The 10-year ASCVD risk score (Wilton HAYWOOD, et al., 2019) is: 2.2%    Values used to calculate the score:      Age: 55 years      Sex: Female      Is Non- : No      Diabetic: No      Tobacco smoker: No      Systolic Blood Pressure: 124 mmHg      Is BP treated: Yes      HDL Cholesterol: 67 mg/dL      Total Cholesterol: 170 mg/dL  On Statin?: Yes, rosuvastatin 5 mg    Immunizations Needed: All up-to-date and documented  Tobacco Use: former smoker    Drug Interactions  No  "relevant drug interactions were noted.    Objective   No Known Allergies  Social History     Social History Narrative    Not on file      Medication Review  Current Outpatient Medications   Medication Instructions    lisinopriL-hydrochlorothiazide 20-12.5 mg tablet 0.5 tablets, oral, Daily    rosuvastatin (CRESTOR) 10 mg, oral, Daily    saccharomyces boulardii (FLORASTOR) 250 mg, oral, 2 times daily    Zepbound 10 mg, subcutaneous, Every 7 days      Vitals  BP Readings from Last 2 Encounters:   03/28/25 124/80   09/12/24 122/82     BMI Readings from Last 1 Encounters:   03/28/25 38.04 kg/m²      Labs  A1C  No results found for: \"HGBA1C\"  BMP  Lab Results   Component Value Date    CALCIUM 9.8 03/28/2025     03/28/2025    K 4.1 03/28/2025    CO2 29 03/28/2025     03/28/2025    BUN 16 03/28/2025    CREATININE 0.83 03/28/2025    EGFR 82 03/28/2025     LFTs  Lab Results   Component Value Date    ALT 17 03/28/2025    AST 17 03/28/2025    ALKPHOS 87 03/28/2025    BILITOT 0.7 03/28/2025     FLP  Lab Results   Component Value Date    TRIG 119 03/28/2025    CHOL 170 03/28/2025    LDLF 74 04/03/2023    LDLCALC 81 03/28/2025    HDL 67 03/28/2025     Urine Microalbumin  No results found for: \"MICROALBCREA\"  Weight Management  Wt Readings from Last 3 Encounters:   03/28/25 94.3 kg (208 lb)   11/08/24 106 kg (233 lb)   09/12/24 106 kg (233 lb)      There is no height or weight on file to calculate BMI.     Assessment/Plan   Problem List Items Addressed This Visit       Morbid obesity with BMI of 45.0-49.9, adult (Multi)    Relevant Orders    Referral to Clinical Pharmacy       Patient has been taking Zepbound since 5/2024 and has lost 52 lbs so far. Weight loss noted on current dose. Tolerating well, no side effects. Will continue the 10 mg and follow up in 4 weeks to evaluate tolerability and efficacy.    Medication Changes:  Continue  Zepbound 10 mg weekly    Future Considerations:  Titrate Zepbound as tolerated to " weight loss goals     Monitoring and Education:  Counseled patient on relevant MOA, expectations, side effects, duration of therapy, contraindications, administration, and monitoring parameters.    Clinical Pharmacist follow-up: 4 weeks, Telehealth visit    Continue all meds under the continuation of care with the referring provider and clinical pharmacy team.    Thank you,  Claribel Fam, PharmD  Clinical Pharmacist  [phone]    Verbal consent to manage patient's drug therapy was obtained from the patient. They were informed they may decline to participate or withdraw from participation in pharmacy services at any time.

## 2025-06-07 ENCOUNTER — PHARMACY VISIT (OUTPATIENT)
Dept: PHARMACY | Facility: CLINIC | Age: 55
End: 2025-06-07
Payer: COMMERCIAL

## 2025-07-11 ENCOUNTER — APPOINTMENT (OUTPATIENT)
Dept: PHARMACY | Facility: HOSPITAL | Age: 55
End: 2025-07-11
Payer: COMMERCIAL

## 2025-07-11 DIAGNOSIS — E66.01 MORBID OBESITY WITH BMI OF 45.0-49.9, ADULT (MULTI): ICD-10-CM

## 2025-07-11 PROCEDURE — RXMED WILLOW AMBULATORY MEDICATION CHARGE

## 2025-07-11 RX ORDER — TIRZEPATIDE 10 MG/.5ML
10 INJECTION, SOLUTION SUBCUTANEOUS
Qty: 2 ML | Refills: 4 | Status: SHIPPED | OUTPATIENT
Start: 2025-07-11

## 2025-07-11 NOTE — PROGRESS NOTES
Clinical Pharmacy Appointment    Patient ID: Indira Solomon is a 55 y.o. female who presents for Obesity.    Pt is here for Follow Up appointment.     Referring Provider: Luis Kulkarni DO  PCP: Luis Kulkarni DO   Last visit with PCP: 9/12/24   Next visit with PCP: 3/28/2025     Subjective   HPI  WEIGHT LOSS  PMH significant for HTN, HLD, obesity.  Special needs/barriers to therapy: None identified    BMI Readings from Last 1 Encounters:   03/28/25 38.04 kg/m²   Starting weight: 250 lbs  Current weight: 198 lbs 7/11/2025  Weight loss: down 53 lbs      Lab Results   Component Value Date    GLUCOSE 90 03/28/2025   Hyperglycemia: Denies signs and symptoms    Lifestyle  Diet: eating healthier   Has worked with nutritionist/dietician? No    Physical Activity: increasing exercise     Other Potential Contributing Factors  Medications that may cause weight gain: none  Mental health: No current concerns  Eating Disorders: Denies Hx of any eating disorder  Comorbidities: dyslipidemias and hypertension    Non-Pharmacological Therapy  Weight loss techniques attempted:  Medications     Pharmacological Therapy  Current Medications: Zepbound 10 mg   Previous Medications: n/a     Adherence: Takes medication as directed and reports no missed doses  Adverse Effects: nausea     Insurance coverage of weight-loss medications? No, plan exclusion  Eligible for copay cards/programs? Yes, commercial insurance       Preventative Care  ASCVD Risk:   The 10-year ASCVD risk score (Wilton HAYWOOD, et al., 2019) is: 2.2%    Values used to calculate the score:      Age: 55 years      Sex: Female      Is Non- : No      Diabetic: No      Tobacco smoker: No      Systolic Blood Pressure: 124 mmHg      Is BP treated: Yes      HDL Cholesterol: 67 mg/dL      Total Cholesterol: 170 mg/dL  On Statin?: Yes, rosuvastatin 5 mg    Immunizations Needed: All up-to-date and documented  Tobacco Use: former smoker    Drug Interactions  No  "relevant drug interactions were noted.    Objective   No Known Allergies  Social History     Social History Narrative    Not on file      Medication Review  Current Outpatient Medications   Medication Instructions    lisinopriL-hydrochlorothiazide 20-12.5 mg tablet 0.5 tablets, oral, Daily    rosuvastatin (CRESTOR) 10 mg, oral, Daily    saccharomyces boulardii (FLORASTOR) 250 mg, oral, 2 times daily    Zepbound 10 mg, subcutaneous, Every 7 days      Vitals  BP Readings from Last 2 Encounters:   03/28/25 124/80   09/12/24 122/82     BMI Readings from Last 1 Encounters:   03/28/25 38.04 kg/m²      Labs  A1C  No results found for: \"HGBA1C\"  BMP  Lab Results   Component Value Date    CALCIUM 9.8 03/28/2025     03/28/2025    K 4.1 03/28/2025    CO2 29 03/28/2025     03/28/2025    BUN 16 03/28/2025    CREATININE 0.83 03/28/2025    EGFR 82 03/28/2025     LFTs  Lab Results   Component Value Date    ALT 17 03/28/2025    AST 17 03/28/2025    ALKPHOS 87 03/28/2025    BILITOT 0.7 03/28/2025     FLP  Lab Results   Component Value Date    TRIG 119 03/28/2025    CHOL 170 03/28/2025    LDLF 74 04/03/2023    LDLCALC 81 03/28/2025    HDL 67 03/28/2025     Urine Microalbumin  No results found for: \"MICROALBCREA\"  Weight Management  Wt Readings from Last 3 Encounters:   03/28/25 94.3 kg (208 lb)   11/08/24 106 kg (233 lb)   09/12/24 106 kg (233 lb)      There is no height or weight on file to calculate BMI.     Assessment/Plan   Problem List Items Addressed This Visit       Morbid obesity with BMI of 45.0-49.9, adult (Multi)    Relevant Medications    tirzepatide, weight loss, (Zepbound) 10 mg/0.5 mL injection    Other Relevant Orders    Referral to Clinical Pharmacy     Patient has been taking Zepbound since 5/2024 and has lost 53 lbs so far. Weight loss noted on current dose. Tolerating well, no side effects. Will continue the 10 mg and follow up in 4 weeks to evaluate tolerability and efficacy.    Medication " Changes:  Continue  Zepbound 10 mg weekly    Future Considerations:  Titrate Zepbound as tolerated to weight loss goals     Monitoring and Education:  Counseled patient on relevant MOA, expectations, side effects, duration of therapy, contraindications, administration, and monitoring parameters.    Clinical Pharmacist follow-up: 4 weeks, Telehealth visit    Continue all meds under the continuation of care with the referring provider and clinical pharmacy team.    Thank you,  Claribel Fam, PharmD  Clinical Pharmacist  [phone]    Verbal consent to manage patient's drug therapy was obtained from the patient. They were informed they may decline to participate or withdraw from participation in pharmacy services at any time.

## 2025-07-12 ENCOUNTER — PHARMACY VISIT (OUTPATIENT)
Dept: PHARMACY | Facility: CLINIC | Age: 55
End: 2025-07-12
Payer: COMMERCIAL

## 2025-08-06 DIAGNOSIS — I10 BENIGN ESSENTIAL HTN: ICD-10-CM

## 2025-08-07 RX ORDER — LISINOPRIL AND HYDROCHLOROTHIAZIDE 12.5; 2 MG/1; MG/1
1 TABLET ORAL DAILY
Qty: 90 TABLET | Refills: 0 | Status: SHIPPED | OUTPATIENT
Start: 2025-08-07 | End: 2025-11-05

## 2025-08-10 PROCEDURE — RXMED WILLOW AMBULATORY MEDICATION CHARGE

## 2025-08-11 ENCOUNTER — PHARMACY VISIT (OUTPATIENT)
Dept: PHARMACY | Facility: CLINIC | Age: 55
End: 2025-08-11
Payer: COMMERCIAL

## 2025-08-29 ENCOUNTER — APPOINTMENT (OUTPATIENT)
Dept: PHARMACY | Facility: HOSPITAL | Age: 55
End: 2025-08-29
Payer: COMMERCIAL

## 2025-08-29 DIAGNOSIS — E66.01 MORBID OBESITY WITH BMI OF 45.0-49.9, ADULT (MULTI): ICD-10-CM

## 2025-08-29 RX ORDER — TIRZEPATIDE 12.5 MG/.5ML
12.5 INJECTION, SOLUTION SUBCUTANEOUS
Qty: 2 ML | Refills: 3 | Status: CANCELLED | OUTPATIENT
Start: 2025-08-29

## 2025-09-02 PROCEDURE — RXMED WILLOW AMBULATORY MEDICATION CHARGE

## 2025-09-03 ENCOUNTER — PHARMACY VISIT (OUTPATIENT)
Dept: PHARMACY | Facility: CLINIC | Age: 55
End: 2025-09-03
Payer: COMMERCIAL

## 2025-09-19 ENCOUNTER — APPOINTMENT (OUTPATIENT)
Dept: PRIMARY CARE | Facility: CLINIC | Age: 55
End: 2025-09-19
Payer: COMMERCIAL

## 2025-09-26 ENCOUNTER — APPOINTMENT (OUTPATIENT)
Dept: PHARMACY | Facility: HOSPITAL | Age: 55
End: 2025-09-26
Payer: COMMERCIAL

## (undated) DEVICE — GRASPER TIP, LONG FENESTRATED, DISP

## (undated) DEVICE — Device

## (undated) DEVICE — FILTER, LAPAROSCOPIC, PLUME PORT, W/LUER CONNECTOR, STERILE

## (undated) DEVICE — TROCAR, KII OPTICAL BLADELESS 5MM Z THREAD 100MM LNGTH

## (undated) DEVICE — DRESSING, DRAIN SPONGE, EXCILON AMD, 4X4 IN, 6 PLY, ST

## (undated) DEVICE — ELECTRODE, ELECTROSURGICAL, PENCIL, HAND CONTROL, BLADE, 10 FT CORD, LF

## (undated) DEVICE — TROCAR SYSTEM, BALLOON, KII GELPORT, 12 X 100MM

## (undated) DEVICE — RETRIEVAL SYSTEM, MONARCH, 10MM DISP ENDOSCOPIC

## (undated) DEVICE — MAT, AIR TRANSFER, 39X81

## (undated) DEVICE — DRESSING, GAUZE, SPONGE, 8 PLY, CURITY, 2 X 2 IN, STERILE

## (undated) DEVICE — STAPLER,  ENDO ECHELON 45MM RELOAD, BLUE

## (undated) DEVICE — DRESSING, TRANSPARENT, TEGADERM, FILM FRAME STYLE, LF

## (undated) DEVICE — IRRIGATOR, HYDRO-SURG PLUS, WITH COJOINED SUCTION AND IRRIGATION

## (undated) DEVICE — SLEEVE, VASO PRESS, CALF GARMENT, MEDIUM, GREEN

## (undated) DEVICE — CATHETER TRAY, SURESTEP, 16FR, URINE METER W/STATLOCK

## (undated) DEVICE — APPLICATOR, CHLORAPREP, W/ORANGE TINT, 26ML

## (undated) DEVICE — DRAIN, WOUND, FLAT, JACKSON-PRATT, FULL PERFORATION, W/O TROCAR, 10 MM, SILICONE

## (undated) DEVICE — SCISSOR TIP, ENDOCUT, LAPAROSCOPIC

## (undated) DEVICE — CARE KIT, LAPAROSCOPIC, ADVANCED

## (undated) DEVICE — STAPLER, ECHELON 3000, 45MM STD

## (undated) DEVICE — NEEDLE, HYPODERMIC, SAFETYGLIDE, SHIELDING, REGULAR WALL, REGULAR BEVEL, 22 G X 1.5 IN, BLACK HUB

## (undated) DEVICE — GRASPER TIP, BABCOCK, 5MM, DISP

## (undated) DEVICE — MARKER, SKIN, DUAL TIP, W/RULER

## (undated) DEVICE — RESERVOIR, DRAINAGE, WOUND, JACKSON-PRATT, 100 CC, SILICONE

## (undated) DEVICE — SLEEVE, KII, Z-THREAD, 5X100CM

## (undated) DEVICE — STAPLER, ENDO ECHELON 45MM RELOAD, WHITE, REUSABLE

## (undated) DEVICE — STRIP, SKIN CLOSURE, STERI STRIP, REINFORCED, 0.5 X 4 IN

## (undated) DEVICE — SPONGE, LAP, XRAY DECT, 4IN X 18IN, W/MASTER DMT, STERILE

## (undated) DEVICE — HOLSTER, ELECTROSURGERY ACCESSORY, STERILE

## (undated) DEVICE — ELECTRODE, LAPAROSCOPIC, SPATULA, 5MM X 32CM

## (undated) DEVICE — COLLECTION/DELIVERY SYSTEM, COPAN ESWAB, REG SIZE SWAB